# Patient Record
Sex: MALE | Race: WHITE | NOT HISPANIC OR LATINO | Employment: FULL TIME | URBAN - METROPOLITAN AREA
[De-identification: names, ages, dates, MRNs, and addresses within clinical notes are randomized per-mention and may not be internally consistent; named-entity substitution may affect disease eponyms.]

---

## 2023-06-22 LAB
ALANINE AMINOTRANSFERASE (SGPT) (U/L) IN SER/PLAS: 25 U/L (ref 10–52)
ALBUMIN (G/DL) IN SER/PLAS: 5 G/DL (ref 3.4–5)
ALKALINE PHOSPHATASE (U/L) IN SER/PLAS: 66 U/L (ref 33–120)
AMPHETAMINE (PRESENCE) IN URINE BY SCREEN METHOD: NORMAL
ASPARTATE AMINOTRANSFERASE (SGOT) (U/L) IN SER/PLAS: 23 U/L (ref 9–39)
BARBITURATES PRESENCE IN URINE BY SCREEN METHOD: NORMAL
BASOPHILS (10*3/UL) IN BLOOD BY AUTOMATED COUNT: 0.03 X10E9/L (ref 0–0.1)
BASOPHILS/100 LEUKOCYTES IN BLOOD BY AUTOMATED COUNT: 0.5 % (ref 0–2)
BENZODIAZEPINE (PRESENCE) IN URINE BY SCREEN METHOD: NORMAL
BILIRUBIN DIRECT (MG/DL) IN SER/PLAS: 0.3 MG/DL (ref 0–0.3)
BILIRUBIN TOTAL (MG/DL) IN SER/PLAS: 1.4 MG/DL (ref 0–1.2)
C REACTIVE PROTEIN (MG/L) IN SER/PLAS: 0.22 MG/DL
CALPROTECTIN, STOOL: NORMAL
CANNABINOIDS IN URINE BY SCREEN METHOD: NORMAL
COCAINE (PRESENCE) IN URINE BY SCREEN METHOD: NORMAL
DRUG SCREEN COMMENT URINE: NORMAL
EOSINOPHILS (10*3/UL) IN BLOOD BY AUTOMATED COUNT: 0.06 X10E9/L (ref 0–0.7)
EOSINOPHILS/100 LEUKOCYTES IN BLOOD BY AUTOMATED COUNT: 1 % (ref 0–6)
ERYTHROCYTE DISTRIBUTION WIDTH (RATIO) BY AUTOMATED COUNT: 12.8 % (ref 11.5–14.5)
ERYTHROCYTE MEAN CORPUSCULAR HEMOGLOBIN CONCENTRATION (G/DL) BY AUTOMATED: 34.1 G/DL (ref 32–36)
ERYTHROCYTE MEAN CORPUSCULAR VOLUME (FL) BY AUTOMATED COUNT: 91 FL (ref 80–100)
ERYTHROCYTES (10*6/UL) IN BLOOD BY AUTOMATED COUNT: 5.46 X10E12/L (ref 4.5–5.9)
FENTANYL URINE: NORMAL
HEMATOCRIT (%) IN BLOOD BY AUTOMATED COUNT: 49.8 % (ref 41–52)
HEMOGLOBIN (G/DL) IN BLOOD: 17 G/DL (ref 13.5–17.5)
IMMATURE GRANULOCYTES/100 LEUKOCYTES IN BLOOD BY AUTOMATED COUNT: 0.3 % (ref 0–0.9)
LEUKOCYTES (10*3/UL) IN BLOOD BY AUTOMATED COUNT: 6.1 X10E9/L (ref 4.4–11.3)
LYMPHOCYTES (10*3/UL) IN BLOOD BY AUTOMATED COUNT: 1.31 X10E9/L (ref 1.2–4.8)
LYMPHOCYTES/100 LEUKOCYTES IN BLOOD BY AUTOMATED COUNT: 21.5 % (ref 13–44)
METHADONE (PRESENCE) IN URINE BY SCREEN METHOD: NORMAL
MONOCYTES (10*3/UL) IN BLOOD BY AUTOMATED COUNT: 0.45 X10E9/L (ref 0.1–1)
MONOCYTES/100 LEUKOCYTES IN BLOOD BY AUTOMATED COUNT: 7.4 % (ref 2–10)
NEUTROPHILS (10*3/UL) IN BLOOD BY AUTOMATED COUNT: 4.21 X10E9/L (ref 1.2–7.7)
NEUTROPHILS/100 LEUKOCYTES IN BLOOD BY AUTOMATED COUNT: 69.3 % (ref 40–80)
NRBC (PER 100 WBCS) BY AUTOMATED COUNT: 0 /100 WBC (ref 0–0)
OPIATES (PRESENCE) IN URINE BY SCREEN METHOD: NORMAL
OXYCODONE (PRESENCE) IN URINE BY SCREEN METHOD: NORMAL
PHENCYCLIDINE (PRESENCE) IN URINE BY SCREEN METHOD: NORMAL
PLATELETS (10*3/UL) IN BLOOD AUTOMATED COUNT: 309 X10E9/L (ref 150–450)
PROTEIN TOTAL: 7.6 G/DL (ref 6.4–8.2)

## 2023-06-23 LAB
CALCIDIOL (25 OH VITAMIN D3) (NG/ML) IN SER/PLAS: 29 NG/ML
COBALAMIN (VITAMIN B12) (PG/ML) IN SER/PLAS: 263 PG/ML (ref 211–911)

## 2023-06-25 LAB
NIL(NEG) CONTROL SPOT COUNT: NORMAL
PANEL A SPOT COUNT: 0
PANEL B SPOT COUNT: 0
POS CONTROL SPOT COUNT: NORMAL
T-SPOT. TB INTERPRETATION: NEGATIVE

## 2023-08-31 PROBLEM — I10 ESSENTIAL HYPERTENSION: Status: ACTIVE | Noted: 2023-08-31

## 2023-08-31 PROBLEM — E53.8 VITAMIN B 12 DEFICIENCY: Status: ACTIVE | Noted: 2023-08-31

## 2023-08-31 PROBLEM — E55.9 VITAMIN D DEFICIENCY: Status: ACTIVE | Noted: 2023-08-31

## 2023-08-31 PROBLEM — K61.1 PERIRECTAL ABSCESS: Status: ACTIVE | Noted: 2023-08-31

## 2023-08-31 PROBLEM — K50.90 CROHN'S DISEASE (MULTI): Status: ACTIVE | Noted: 2023-08-31

## 2023-08-31 PROBLEM — K52.9 CHRONIC DIARRHEA: Status: ACTIVE | Noted: 2023-08-31

## 2023-08-31 PROBLEM — F41.0 PANIC ATTACKS: Status: ACTIVE | Noted: 2023-08-31

## 2023-08-31 PROBLEM — K60.3 TRANSSPHINCTERIC ANAL FISTULA: Status: ACTIVE | Noted: 2023-08-31

## 2023-08-31 PROBLEM — K60.329 TRANSSPHINCTERIC ANAL FISTULA: Status: ACTIVE | Noted: 2023-08-31

## 2023-08-31 RX ORDER — ATENOLOL AND CHLORTHALIDONE TABLET 100; 25 MG/1; MG/1
1 TABLET ORAL DAILY
COMMUNITY
End: 2023-10-31 | Stop reason: ALTCHOICE

## 2023-08-31 RX ORDER — EMTRICITABINE AND TENOFOVIR DISOPROXIL FUMARATE 200; 300 MG/1; MG/1
TABLET, FILM COATED ORAL EVERY 24 HOURS
COMMUNITY
Start: 2019-05-21 | End: 2023-10-31 | Stop reason: ALTCHOICE

## 2023-08-31 RX ORDER — PNV NO.95/FERROUS FUM/FOLIC AC 28MG-0.8MG
1 TABLET ORAL DAILY
COMMUNITY
Start: 2022-02-23 | End: 2023-10-31 | Stop reason: ALTCHOICE

## 2023-08-31 RX ORDER — CIPROFLOXACIN 500 MG/1
1 TABLET ORAL 2 TIMES DAILY
COMMUNITY
Start: 2022-02-10 | End: 2023-10-31 | Stop reason: ALTCHOICE

## 2023-08-31 RX ORDER — DESVENLAFAXINE 50 MG/1
1 TABLET, EXTENDED RELEASE ORAL DAILY
COMMUNITY
End: 2023-10-31 | Stop reason: ALTCHOICE

## 2023-08-31 RX ORDER — MERCAPTOPURINE 50 MG/1
2 TABLET ORAL DAILY
COMMUNITY
Start: 2022-03-03 | End: 2023-10-31 | Stop reason: ALTCHOICE

## 2023-08-31 RX ORDER — DIPHENOXYLATE HYDROCHLORIDE AND ATROPINE SULFATE 2.5; .025 MG/1; MG/1
TABLET ORAL 4 TIMES DAILY
COMMUNITY

## 2023-10-09 ENCOUNTER — ANESTHESIA EVENT (OUTPATIENT)
Dept: GASTROENTEROLOGY | Facility: HOSPITAL | Age: 33
End: 2023-10-09
Payer: COMMERCIAL

## 2023-10-09 ENCOUNTER — ANESTHESIA (OUTPATIENT)
Dept: GASTROENTEROLOGY | Facility: HOSPITAL | Age: 33
End: 2023-10-09
Payer: COMMERCIAL

## 2023-10-09 ENCOUNTER — HOSPITAL ENCOUNTER (OUTPATIENT)
Dept: GASTROENTEROLOGY | Facility: HOSPITAL | Age: 33
Discharge: HOME | End: 2023-10-09
Payer: COMMERCIAL

## 2023-10-09 VITALS
HEART RATE: 80 BPM | RESPIRATION RATE: 18 BRPM | WEIGHT: 200 LBS | BODY MASS INDEX: 28.63 KG/M2 | TEMPERATURE: 98.2 F | DIASTOLIC BLOOD PRESSURE: 92 MMHG | SYSTOLIC BLOOD PRESSURE: 129 MMHG | HEIGHT: 70 IN | OXYGEN SATURATION: 96 %

## 2023-10-09 DIAGNOSIS — Z12.11 ENCOUNTER FOR SCREENING FOR MALIGNANT NEOPLASM OF COLON: Primary | ICD-10-CM

## 2023-10-09 PROCEDURE — A45380 PR COLONOSCOPY,BIOPSY

## 2023-10-09 PROCEDURE — 2580000001 HC RX 258 IV SOLUTIONS: Performed by: STUDENT IN AN ORGANIZED HEALTH CARE EDUCATION/TRAINING PROGRAM

## 2023-10-09 PROCEDURE — 7100000010 HC PHASE TWO TIME - EACH INCREMENTAL 1 MINUTE: Performed by: STUDENT IN AN ORGANIZED HEALTH CARE EDUCATION/TRAINING PROGRAM

## 2023-10-09 PROCEDURE — 2500000005 HC RX 250 GENERAL PHARMACY W/O HCPCS

## 2023-10-09 PROCEDURE — 88305 TISSUE EXAM BY PATHOLOGIST: CPT | Mod: TC,SUR | Performed by: INTERNAL MEDICINE

## 2023-10-09 PROCEDURE — A45380 PR COLONOSCOPY,BIOPSY: Performed by: ANESTHESIOLOGY

## 2023-10-09 PROCEDURE — 7100000009 HC PHASE TWO TIME - INITIAL BASE CHARGE: Performed by: STUDENT IN AN ORGANIZED HEALTH CARE EDUCATION/TRAINING PROGRAM

## 2023-10-09 PROCEDURE — 2720000007 HC OR 272 NO HCPCS: Performed by: STUDENT IN AN ORGANIZED HEALTH CARE EDUCATION/TRAINING PROGRAM

## 2023-10-09 PROCEDURE — 3700000002 HC GENERAL ANESTHESIA TIME - EACH INCREMENTAL 1 MINUTE: Performed by: STUDENT IN AN ORGANIZED HEALTH CARE EDUCATION/TRAINING PROGRAM

## 2023-10-09 PROCEDURE — 45380 COLONOSCOPY AND BIOPSY: CPT | Performed by: INTERNAL MEDICINE

## 2023-10-09 PROCEDURE — 3700000001 HC GENERAL ANESTHESIA TIME - INITIAL BASE CHARGE: Performed by: STUDENT IN AN ORGANIZED HEALTH CARE EDUCATION/TRAINING PROGRAM

## 2023-10-09 PROCEDURE — 88305 TISSUE EXAM BY PATHOLOGIST: CPT | Mod: TC | Performed by: INTERNAL MEDICINE

## 2023-10-09 PROCEDURE — 88305 TISSUE EXAM BY PATHOLOGIST: CPT | Performed by: PATHOLOGY

## 2023-10-09 PROCEDURE — 45386 COLONOSCOPY W/BALLOON DILAT: CPT | Performed by: INTERNAL MEDICINE

## 2023-10-09 PROCEDURE — C1726 CATH, BAL DIL, NON-VASCULAR: HCPCS | Performed by: STUDENT IN AN ORGANIZED HEALTH CARE EDUCATION/TRAINING PROGRAM

## 2023-10-09 PROCEDURE — 2500000004 HC RX 250 GENERAL PHARMACY W/ HCPCS (ALT 636 FOR OP/ED)

## 2023-10-09 RX ORDER — ONDANSETRON HYDROCHLORIDE 2 MG/ML
INJECTION, SOLUTION INTRAVENOUS AS NEEDED
Status: DISCONTINUED | OUTPATIENT
Start: 2023-10-09 | End: 2023-10-09

## 2023-10-09 RX ORDER — PROPOFOL 10 MG/ML
INJECTION, EMULSION INTRAVENOUS AS NEEDED
Status: DISCONTINUED | OUTPATIENT
Start: 2023-10-09 | End: 2023-10-09

## 2023-10-09 RX ORDER — PROPOFOL 10 MG/ML
INJECTION, EMULSION INTRAVENOUS CONTINUOUS PRN
Status: DISCONTINUED | OUTPATIENT
Start: 2023-10-09 | End: 2023-10-09

## 2023-10-09 RX ORDER — LIDOCAINE HCL/PF 100 MG/5ML
SYRINGE (ML) INTRAVENOUS AS NEEDED
Status: DISCONTINUED | OUTPATIENT
Start: 2023-10-09 | End: 2023-10-09

## 2023-10-09 RX ORDER — SODIUM CHLORIDE, SODIUM LACTATE, POTASSIUM CHLORIDE, CALCIUM CHLORIDE 600; 310; 30; 20 MG/100ML; MG/100ML; MG/100ML; MG/100ML
20 INJECTION, SOLUTION INTRAVENOUS CONTINUOUS
Status: DISCONTINUED | OUTPATIENT
Start: 2023-10-09 | End: 2023-10-20 | Stop reason: HOSPADM

## 2023-10-09 RX ADMIN — SODIUM CHLORIDE, POTASSIUM CHLORIDE, SODIUM LACTATE AND CALCIUM CHLORIDE: 600; 310; 30; 20 INJECTION, SOLUTION INTRAVENOUS at 10:54

## 2023-10-09 RX ADMIN — LIDOCAINE HYDROCHLORIDE 80 MG: 20 INJECTION INTRAVENOUS at 10:58

## 2023-10-09 RX ADMIN — PROPOFOL 50 MG: 10 INJECTION, EMULSION INTRAVENOUS at 10:59

## 2023-10-09 RX ADMIN — PROPOFOL 200 MCG/KG/MIN: 10 INJECTION, EMULSION INTRAVENOUS at 10:58

## 2023-10-09 RX ADMIN — PROPOFOL 100 MG: 10 INJECTION, EMULSION INTRAVENOUS at 10:58

## 2023-10-09 ASSESSMENT — PAIN - FUNCTIONAL ASSESSMENT
PAIN_FUNCTIONAL_ASSESSMENT: 0-10

## 2023-10-09 ASSESSMENT — PAIN SCALES - GENERAL
PAINLEVEL_OUTOF10: 0 - NO PAIN
PAIN_LEVEL: 0
PAINLEVEL_OUTOF10: 0 - NO PAIN
PAINLEVEL_OUTOF10: 0 - NO PAIN
PAINLEVEL_OUTOF10: 2
PAINLEVEL_OUTOF10: 0 - NO PAIN

## 2023-10-09 ASSESSMENT — COLUMBIA-SUICIDE SEVERITY RATING SCALE - C-SSRS
2. HAVE YOU ACTUALLY HAD ANY THOUGHTS OF KILLING YOURSELF?: NO
6. HAVE YOU EVER DONE ANYTHING, STARTED TO DO ANYTHING, OR PREPARED TO DO ANYTHING TO END YOUR LIFE?: NO
1. IN THE PAST MONTH, HAVE YOU WISHED YOU WERE DEAD OR WISHED YOU COULD GO TO SLEEP AND NOT WAKE UP?: NO

## 2023-10-09 NOTE — H&P
History Of Present Illness  Benjamín Bear is a 33 y.o. male presenting for colonoscopy for Crohn's disease.     Past Medical History  Past Medical History:   Diagnosis Date    Other conditions influencing health status 02/11/2022    No significant past medical history       Surgical History  Past Surgical History:   Procedure Laterality Date    OTHER SURGICAL HISTORY  02/11/2022    Ileostomy    OTHER SURGICAL HISTORY  02/11/2022    Colectomy    OTHER SURGICAL HISTORY  02/11/2022    Ileostomy closure        Social History  He has no history on file for tobacco use, alcohol use, and drug use.    Family History  No family history on file.     Allergies  Midazolam and Penicillins    Review of Systems     Physical Exam     Last Recorded Vitals  There were no vitals taken for this visit.    Relevant Results           Assessment/Plan           Heather Gambino MD

## 2023-10-09 NOTE — ANESTHESIA POSTPROCEDURE EVALUATION
Patient: Benjamín Bear    Procedure Summary       Date: 10/09/23 Room / Location: [facility]    Anesthesia Start: 1054 Anesthesia Stop: 1144    Procedure: COLONOSCOPY Diagnosis:       Encounter for screening for malignant neoplasm of colon      Encounter for screening for malignant neoplasm of colon    Scheduled Providers: Tushar Pagan MD; Brittany Logan RN; Jenny Carrillo MD Responsible Provider: Jenny Carrillo MD    Anesthesia Type: MAC ASA Status: 2            Anesthesia Type: MAC    Vitals Value Taken Time   /66 10/09/23 1145   Temp 36.8 °C (98.2 °F) 10/09/23 1145   Pulse 71 10/09/23 1145   Resp 18 10/09/23 1145   SpO2 98 % 10/09/23 1145       Anesthesia Post Evaluation    Patient location during evaluation: PACU  Patient participation: complete - patient participated  Level of consciousness: awake  Pain score: 0  Pain management: adequate  There was medical reason for not using a multimodal analgesia pain management approach.Airway patency: patent  There was medical reason for not screening for obstructive sleep apnea and/or not using of two or more mitigation strategies.Cardiovascular status: acceptable  Respiratory status: acceptable  Hydration status: acceptable    There were no known notable events for this encounter.

## 2023-10-09 NOTE — ANESTHESIA PREPROCEDURE EVALUATION
Patient: Benjamín Bear    Procedure Information       Date/Time: 10/09/23 1030    Scheduled providers: Tushar Pagan MD; Brittany Logan RN; Jenny Carrillo MD    Procedure: COLONOSCOPY    Location: [facility]            Relevant Problems   Cardiovascular   (+) Essential hypertension      GI   (+) Chronic diarrhea   (+) Crohn's disease (CMS/HCC)      Neuro/Psych   (+) Panic attacks       Clinical information reviewed:  There were no vitals filed for this visit.    Past Surgical History:   Procedure Laterality Date   • OTHER SURGICAL HISTORY  02/11/2022    Ileostomy   • OTHER SURGICAL HISTORY  02/11/2022    Colectomy   • OTHER SURGICAL HISTORY  02/11/2022    Ileostomy closure     Past Medical History:   Diagnosis Date   • Other conditions influencing health status 02/11/2022    No significant past medical history       Current Outpatient Medications:   •  atenoloL-chlorthalidone (Tenoretic) 100-25 mg tablet, Take 1 tablet by mouth once daily., Disp: , Rfl:   •  ciprofloxacin (Cipro) 500 mg tablet, Take 1 tablet (500 mg) by mouth 2 times a day., Disp: , Rfl:   •  cyanocobalamin (Vitamin B-12) 100 mcg tablet, Take 1 tablet (100 mcg) by mouth once daily., Disp: , Rfl:   •  desvenlafaxine (Pristiq) 50 mg 24 hr tablet, Take 1 tablet (50 mg) by mouth once daily., Disp: , Rfl:   •  diphenoxylate-atropine (Lomotil) 2.5-0.025 mg tablet, Take by mouth 4 times a day., Disp: , Rfl:   •  emtricitabine-tenofovir, TDF, (Truvada) 200-300 mg tablet, once every 24 hours., Disp: , Rfl:   •  mercaptopurine (Purinethol) 50 mg tablet, Take 2 tablets (100 mg total) by mouth once daily., Disp: , Rfl:   Prior to Admission medications    Medication Sig Start Date End Date Taking? Authorizing Provider   atenoloL-chlorthalidone (Tenoretic) 100-25 mg tablet Take 1 tablet by mouth once daily.    Historical Provider, MD   ciprofloxacin (Cipro) 500 mg tablet Take 1 tablet (500 mg) by mouth 2 times a day. 2/10/22   Historical  Provider, MD   cyanocobalamin (Vitamin B-12) 100 mcg tablet Take 1 tablet (100 mcg) by mouth once daily. 2/23/22   Historical Provider, MD   desvenlafaxine (Pristiq) 50 mg 24 hr tablet Take 1 tablet (50 mg) by mouth once daily.    Historical Provider, MD   diphenoxylate-atropine (Lomotil) 2.5-0.025 mg tablet Take by mouth 4 times a day.    Historical Provider, MD   emtricitabine-tenofovir, TDF, (Truvada) 200-300 mg tablet once every 24 hours. 5/21/19   Historical Provider, MD   mercaptopurine (Purinethol) 50 mg tablet Take 2 tablets (100 mg total) by mouth once daily. 3/3/22   Historical Provider, MD           Chemistry    Lab Results   Component Value Date/Time     06/30/2022 2315    K 4.1 06/30/2022 2315     06/30/2022 2315    CO2 27 06/30/2022 2315    BUN 18 06/30/2022 2315    CREATININE 1.23 06/30/2022 2315    Lab Results   Component Value Date/Time    CALCIUM 8.8 06/30/2022 2315    ALKPHOS 66 06/22/2023 1439    AST 23 06/22/2023 1439    ALT 25 06/22/2023 1439    BILITOT 1.4 (H) 06/22/2023 1439          Lab Results   Component Value Date/Time    WBC 6.1 06/22/2023 1439    HGB 17.0 06/22/2023 1439    HCT 49.8 06/22/2023 1439     06/22/2023 1439         NPO Detail:  No data recorded     Physical Exam    Airway  Mallampati: II  TM distance: <3 FB  Neck ROM: full     Cardiovascular   Rhythm: regular  Rate: normal     Dental        Pulmonary - normal exam     Abdominal        Anesthesia Plan    ASA 2     MAC     Anesthetic plan and risks discussed with patient.    Plan discussed with CAA and attending.

## 2023-10-17 LAB
LABORATORY COMMENT REPORT: NORMAL
PATH REPORT.FINAL DX SPEC: NORMAL
PATH REPORT.GROSS SPEC: NORMAL
PATH REPORT.TOTAL CANCER: NORMAL

## 2023-10-27 NOTE — PROGRESS NOTES
HPI    Benjamín Bear is a 33 y.o. male with a history of Crohn's disease diagnosed in 2014 c/b small bowel strictures, s/p multiple small bowel resections with DLI in 2017 and reversal in 2018. His predominant symptoms at the time of diagnosis were recurrent abdominal cramps with sometimes vomiting and recurrent diarrhea 10-15 BM's that was going on for few years before being diagnosed. He has failed multiple biologics including Remicade, Humira, Cimzia, Stelara, and Entyvio.      Starting in summer 2021 he had recurrent perianal pain with purulent drainage and was treated with steroids and abx intermittently. 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 Treated with antibiotics. On 2/23/22 he underwent EUA, seton placement and colonoscopy ([facility]).      He underwent another EUA with seton placement on 8/17/22 for a large right perianal abscess. He was seen by Margarita 3/28/23 and was found to have a deep and superficial perianal abscess at the insertion sites of the setons. He was treated with Cipro/Flagyl. He was last seen 6/2023 and was on Skyrizi and has undergone colonoscopy since his last visit.     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 He thinks drainage has decreased on average. He is having less build up and drainage. Discomfort has improved. Skin is better. Drainage almost completely stopped when he was on flagyl in June.     CRP 6/2023: 0.22     Colonoscopy 10/9/2023 ([facility]): Stricture (traversable after dilation) in the olimpia-terminal ileum; dilated to 15 mm end size. Dilation caused disruption of ring and improved passage of the scope. Mild Crohn's ileitis and left colitis with SES-CD aggregate score of 8, significantly improved from prior study (previously SES-CD score 20 in 2/2022). Performed forceps biopsies in the terminal ileum  and throughout the colon. Path of neoterminal ileum with chronic inactive inflammation. Three setons and one fistula opening actively draining purulent material on perianal exam. Repeat in 1 year.      Vapes nicotine/No ETOH/Occasional marijuana       Past Medical History:   Diagnosis Date    Other conditions influencing health status 02/11/2022    No significant past medical history    Personal history of other diseases of the digestive system     History of Crohn's disease       Past Surgical History:   Procedure Laterality Date    OTHER SURGICAL HISTORY  02/11/2022    Ileostomy    OTHER SURGICAL HISTORY  02/11/2022    Colectomy    OTHER SURGICAL HISTORY  02/11/2022    Ileostomy closure       Allergies   Allergen Reactions    Midazolam Other and Unknown    Penicillins Rash and Unknown         Physical Exam  Setons in place, still with moderate amount of drainage. Significant granulation tissue around setons. Only one of his setons is truly a perianal fistula; the others are in soft tissue only and not sphincters. One external opening with granulation only that does not have a seton in place.     Assessment and Plan:     Mr Bear still has more drainage than would be expected from his perineum. He does have a lot of granulation tissue.   Recent colonoscopy shows no active inflammation. I'd like to do some of the labs he was ordered for to assess for inflammation (ESR, CRP, calprotectin). Interestingly, when he has been on Flagyl, the perineum completely dries with minimal drainage.   He would like to get the setons out. We talked about the risk of recurrent abscess. However, he might also benefit from destruction of granulation tissue as that may be driving some of the drainage both from the tissue itself as well as preventing the setons from working optimally.   If he wants to try removing some of the lateral setons, since he fully understands the potential outcomes, I think its reasonable to try. However, he  has to be completely off nicotine. We also talked about the risk of nonhealing wounds or recurrent infections requiring return to the OR to put them back in. I will speak to him after labs result and I will also speak with Dr. Pagan.

## 2023-10-31 ENCOUNTER — LAB (OUTPATIENT)
Dept: LAB | Facility: LAB | Age: 33
End: 2023-10-31
Payer: COMMERCIAL

## 2023-10-31 ENCOUNTER — OFFICE VISIT (OUTPATIENT)
Dept: SURGERY | Facility: CLINIC | Age: 33
End: 2023-10-31
Payer: COMMERCIAL

## 2023-10-31 VITALS
HEART RATE: 74 BPM | DIASTOLIC BLOOD PRESSURE: 86 MMHG | SYSTOLIC BLOOD PRESSURE: 136 MMHG | WEIGHT: 197 LBS | BODY MASS INDEX: 28.27 KG/M2

## 2023-10-31 DIAGNOSIS — K52.9 CHRONIC DIARRHEA: ICD-10-CM

## 2023-10-31 DIAGNOSIS — K61.1 PERIRECTAL ABSCESS: ICD-10-CM

## 2023-10-31 PROCEDURE — 85652 RBC SED RATE AUTOMATED: CPT

## 2023-10-31 PROCEDURE — 3079F DIAST BP 80-89 MM HG: CPT | Performed by: SURGERY

## 2023-10-31 PROCEDURE — 99213 OFFICE O/P EST LOW 20 MIN: CPT | Performed by: SURGERY

## 2023-10-31 PROCEDURE — 3075F SYST BP GE 130 - 139MM HG: CPT | Performed by: SURGERY

## 2023-10-31 PROCEDURE — 86140 C-REACTIVE PROTEIN: CPT

## 2023-10-31 PROCEDURE — 36415 COLL VENOUS BLD VENIPUNCTURE: CPT

## 2023-10-31 RX ORDER — CERTOLIZUMAB PEGOL 200 MG/ML
INJECTION, SOLUTION SUBCUTANEOUS
Status: ON HOLD | COMMUNITY
End: 2024-01-23 | Stop reason: ALTCHOICE

## 2023-10-31 NOTE — LETTER
November 2, 2023     Tushar Pagan MD  [address]  Department Of Medicine-Gastroenterology  [facility] [address]    Patient: Benjamín Bear   YOB: 1990   Date of Visit: 10/31/2023       Dear Dr. Tushar Pagan MD:    Thank you for referring Benjamín Bear to me for evaluation. Below are my notes for this consultation.  If you have questions, please do not hesitate to call me. I look forward to following your patient along with you.       Sincerely,     Floridalma Magaña MD      CC: No Recipients  ______________________________________________________________________________________    HPI    Benjamín Bear is a 33 y.o. male with a history of Crohn's disease diagnosed in 2014 c/b small bowel strictures, s/p multiple small bowel resections with DLI in 2017 and reversal in 2018. His predominant symptoms at the time of diagnosis were recurrent abdominal cramps with sometimes vomiting and recurrent diarrhea 10-15 BM's that was going on for few years before being diagnosed. He has failed multiple biologics including Remicade, Humira, Cimzia, Stelara, and Entyvio.      Starting in summer 2021 he had recurrent perianal pain with purulent drainage and was treated with steroids and abx intermittently. 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 Treated with antibiotics. On 2/23/22 he underwent EUA, seton placement and colonoscopy ([facility]).      He underwent another EUA with seton placement on 8/17/22 for a large right perianal abscess. He was seen by Margarita 3/28/23 and was found to have a deep and superficial perianal abscess at the insertion sites of the setons. He was treated with Cipro/Flagyl. He was last seen 6/2023 and was on Skyrizi and has undergone colonoscopy since his last visit.     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 He thinks drainage has decreased on average. He is having less build up and drainage. Discomfort has improved. Skin is better. Drainage almost completely stopped when he was on flagyl in June.     CRP 6/2023: 0.22     Colonoscopy 10/9/2023 ([facility]): Stricture (traversable after dilation) in the olimpia-terminal ileum; dilated to 15 mm end size. Dilation caused disruption of ring and improved passage of the scope. Mild Crohn's ileitis and left colitis with SES-CD aggregate score of 8, significantly improved from prior study (previously SES-CD score 20 in 2/2022). Performed forceps biopsies in the terminal ileum and throughout the colon. Path of neoterminal ileum with chronic inactive inflammation. Three setons and one fistula opening actively draining purulent material on perianal exam. Repeat in 1 year.      Vapes nicotine/No ETOH/Occasional marijuana       Past Medical History:   Diagnosis Date   • Other conditions influencing health status 02/11/2022    No significant past medical history   • Personal history of other diseases of the digestive system     History of Crohn's disease       Past Surgical History:   Procedure Laterality Date   • OTHER SURGICAL HISTORY  02/11/2022    Ileostomy   • OTHER SURGICAL HISTORY  02/11/2022    Colectomy   • OTHER SURGICAL HISTORY  02/11/2022    Ileostomy closure       Allergies   Allergen Reactions   • Midazolam Other and Unknown   • Penicillins Rash and Unknown         Physical Exam  Setons in place, still with moderate amount of drainage. Significant granulation tissue around setons. Only one of his setons is truly a perianal fistula; the others are in soft tissue only and not sphincters. One external opening with granulation only that does not have a seton in place.     Assessment and Plan:     Mr Bear still has more drainage than would be expected from his perineum. He does have a lot of granulation tissue.   Recent colonoscopy shows no active inflammation. I'd like  to do some of the labs he was ordered for to assess for inflammation (ESR, CRP, calprotectin). Interestingly, when he has been on Flagyl, the perineum completely dries with minimal drainage.   He would like to get the setons out. We talked about the risk of recurrent abscess. However, he might also benefit from destruction of granulation tissue as that may be driving some of the drainage both from the tissue itself as well as preventing the setons from working optimally.   If he wants to try removing some of the lateral setons, since he fully understands the potential outcomes, I think its reasonable to try. However, he has to be completely off nicotine. We also talked about the risk of nonhealing wounds or recurrent infections requiring return to the OR to put them back in. I will speak to him after labs result and I will also speak with Dr. Pagan.

## 2023-11-01 ENCOUNTER — LAB (OUTPATIENT)
Dept: LAB | Facility: LAB | Age: 33
End: 2023-11-01
Payer: COMMERCIAL

## 2023-11-01 DIAGNOSIS — K61.1 PERIRECTAL ABSCESS: ICD-10-CM

## 2023-11-01 LAB
CRP SERPL-MCNC: 0.26 MG/DL
ERYTHROCYTE [SEDIMENTATION RATE] IN BLOOD BY WESTERGREN METHOD: 8 MM/H (ref 0–15)

## 2023-11-01 PROCEDURE — 83993 ASSAY FOR CALPROTECTIN FECAL: CPT

## 2023-11-05 LAB — CALPROTECTIN STL-MCNT: 11 UG/G

## 2023-11-16 DIAGNOSIS — K50.813 CROHN'S DISEASE OF BOTH SMALL AND LARGE INTESTINE WITH FISTULA (MULTI): Primary | ICD-10-CM

## 2023-11-16 RX ORDER — RISANKIZUMAB-RZAA 360 MG/2.4
WEARABLE INJECTOR SUBCUTANEOUS
Qty: 1 ML | Refills: 3 | Status: SHIPPED | OUTPATIENT
Start: 2023-11-16

## 2024-01-12 ENCOUNTER — PREP FOR PROCEDURE (OUTPATIENT)
Dept: POSTOP/PACU | Facility: HOSPITAL | Age: 34
End: 2024-01-12
Payer: COMMERCIAL

## 2024-01-12 DIAGNOSIS — K50.819 CROHN'S DISEASE OF SMALL AND LARGE INTESTINES WITH COMPLICATION (MULTI): Primary | ICD-10-CM

## 2024-01-23 ENCOUNTER — ANESTHESIA (OUTPATIENT)
Dept: OPERATING ROOM | Facility: HOSPITAL | Age: 34
End: 2024-01-23
Payer: COMMERCIAL

## 2024-01-23 ENCOUNTER — ANESTHESIA EVENT (OUTPATIENT)
Dept: OPERATING ROOM | Facility: HOSPITAL | Age: 34
End: 2024-01-23
Payer: COMMERCIAL

## 2024-01-23 ENCOUNTER — HOSPITAL ENCOUNTER (OUTPATIENT)
Facility: HOSPITAL | Age: 34
Setting detail: OUTPATIENT SURGERY
Discharge: HOME | End: 2024-01-23
Attending: SURGERY | Admitting: SURGERY
Payer: COMMERCIAL

## 2024-01-23 VITALS
OXYGEN SATURATION: 100 % | BODY MASS INDEX: 28.5 KG/M2 | HEIGHT: 70 IN | TEMPERATURE: 97.3 F | WEIGHT: 199.08 LBS | DIASTOLIC BLOOD PRESSURE: 92 MMHG | HEART RATE: 101 BPM | SYSTOLIC BLOOD PRESSURE: 136 MMHG | RESPIRATION RATE: 16 BRPM

## 2024-01-23 DIAGNOSIS — K50.813 CROHN'S DISEASE OF BOTH SMALL AND LARGE INTESTINE WITH FISTULA (MULTI): Primary | ICD-10-CM

## 2024-01-23 DIAGNOSIS — K50.819 CROHN'S DISEASE OF SMALL AND LARGE INTESTINES WITH COMPLICATION (MULTI): ICD-10-CM

## 2024-01-23 PROCEDURE — 46280 REMOVE ANAL FIST COMPLEX: CPT | Performed by: SURGERY

## 2024-01-23 PROCEDURE — 3700000001 HC GENERAL ANESTHESIA TIME - INITIAL BASE CHARGE: Performed by: SURGERY

## 2024-01-23 PROCEDURE — 2500000004 HC RX 250 GENERAL PHARMACY W/ HCPCS (ALT 636 FOR OP/ED): Performed by: SURGERY

## 2024-01-23 PROCEDURE — 2500000005 HC RX 250 GENERAL PHARMACY W/O HCPCS: Performed by: NURSE ANESTHETIST, CERTIFIED REGISTERED

## 2024-01-23 PROCEDURE — 2500000005 HC RX 250 GENERAL PHARMACY W/O HCPCS

## 2024-01-23 PROCEDURE — 2500000005 HC RX 250 GENERAL PHARMACY W/O HCPCS: Performed by: SURGERY

## 2024-01-23 PROCEDURE — 7100000002 HC RECOVERY ROOM TIME - EACH INCREMENTAL 1 MINUTE: Performed by: SURGERY

## 2024-01-23 PROCEDURE — 7100000001 HC RECOVERY ROOM TIME - INITIAL BASE CHARGE: Performed by: SURGERY

## 2024-01-23 PROCEDURE — 3600000008 HC OR TIME - EACH INCREMENTAL 1 MINUTE - PROCEDURE LEVEL THREE: Performed by: SURGERY

## 2024-01-23 PROCEDURE — 7100000010 HC PHASE TWO TIME - EACH INCREMENTAL 1 MINUTE: Performed by: SURGERY

## 2024-01-23 PROCEDURE — A4217 STERILE WATER/SALINE, 500 ML: HCPCS | Performed by: SURGERY

## 2024-01-23 PROCEDURE — 2500000001 HC RX 250 WO HCPCS SELF ADMINISTERED DRUGS (ALT 637 FOR MEDICARE OP): Performed by: ANESTHESIOLOGY

## 2024-01-23 PROCEDURE — 2720000007 HC OR 272 NO HCPCS: Performed by: SURGERY

## 2024-01-23 PROCEDURE — 3700000002 HC GENERAL ANESTHESIA TIME - EACH INCREMENTAL 1 MINUTE: Performed by: SURGERY

## 2024-01-23 PROCEDURE — 7100000009 HC PHASE TWO TIME - INITIAL BASE CHARGE: Performed by: SURGERY

## 2024-01-23 PROCEDURE — 2500000004 HC RX 250 GENERAL PHARMACY W/ HCPCS (ALT 636 FOR OP/ED): Performed by: NURSE ANESTHETIST, CERTIFIED REGISTERED

## 2024-01-23 PROCEDURE — 3600000003 HC OR TIME - INITIAL BASE CHARGE - PROCEDURE LEVEL THREE: Performed by: SURGERY

## 2024-01-23 RX ORDER — SODIUM CHLORIDE, SODIUM LACTATE, POTASSIUM CHLORIDE, CALCIUM CHLORIDE 600; 310; 30; 20 MG/100ML; MG/100ML; MG/100ML; MG/100ML
50 INJECTION, SOLUTION INTRAVENOUS CONTINUOUS
Status: CANCELLED | OUTPATIENT
Start: 2024-01-23

## 2024-01-23 RX ORDER — ONDANSETRON HYDROCHLORIDE 2 MG/ML
4 INJECTION, SOLUTION INTRAVENOUS ONCE AS NEEDED
Status: DISCONTINUED | OUTPATIENT
Start: 2024-01-23 | End: 2024-01-23 | Stop reason: HOSPADM

## 2024-01-23 RX ORDER — SODIUM CHLORIDE, SODIUM LACTATE, POTASSIUM CHLORIDE, CALCIUM CHLORIDE 600; 310; 30; 20 MG/100ML; MG/100ML; MG/100ML; MG/100ML
INJECTION, SOLUTION INTRAVENOUS CONTINUOUS PRN
Status: DISCONTINUED | OUTPATIENT
Start: 2024-01-23 | End: 2024-01-23

## 2024-01-23 RX ORDER — METRONIDAZOLE 250 MG/1
250 TABLET ORAL 3 TIMES DAILY
Qty: 21 TABLET | Refills: 0 | Status: SHIPPED | OUTPATIENT
Start: 2024-01-23 | End: 2024-01-30

## 2024-01-23 RX ORDER — POVIDONE-IODINE 10 %
SOLUTION, NON-ORAL TOPICAL AS NEEDED
Status: DISCONTINUED | OUTPATIENT
Start: 2024-01-23 | End: 2024-01-23 | Stop reason: HOSPADM

## 2024-01-23 RX ORDER — FENTANYL CITRATE 50 UG/ML
INJECTION, SOLUTION INTRAMUSCULAR; INTRAVENOUS AS NEEDED
Status: DISCONTINUED | OUTPATIENT
Start: 2024-01-23 | End: 2024-01-23

## 2024-01-23 RX ORDER — ONDANSETRON HYDROCHLORIDE 2 MG/ML
INJECTION, SOLUTION INTRAVENOUS AS NEEDED
Status: DISCONTINUED | OUTPATIENT
Start: 2024-01-23 | End: 2024-01-23

## 2024-01-23 RX ORDER — OXYCODONE HYDROCHLORIDE 5 MG/1
5 TABLET ORAL EVERY 6 HOURS PRN
Qty: 12 TABLET | Refills: 0 | Status: SHIPPED | OUTPATIENT
Start: 2024-01-23 | End: 2024-01-30

## 2024-01-23 RX ORDER — SODIUM CHLORIDE 0.9 G/100ML
IRRIGANT IRRIGATION AS NEEDED
Status: DISCONTINUED | OUTPATIENT
Start: 2024-01-23 | End: 2024-01-23 | Stop reason: HOSPADM

## 2024-01-23 RX ORDER — ACETAMINOPHEN 325 MG/1
650 TABLET ORAL EVERY 4 HOURS PRN
Status: DISCONTINUED | OUTPATIENT
Start: 2024-01-23 | End: 2024-01-23 | Stop reason: HOSPADM

## 2024-01-23 RX ORDER — BUPIVACAINE HYDROCHLORIDE AND EPINEPHRINE 2.5; 5 MG/ML; UG/ML
INJECTION, SOLUTION EPIDURAL; INFILTRATION; INTRACAUDAL; PERINEURAL
Status: COMPLETED
Start: 2024-01-23 | End: 2024-01-23

## 2024-01-23 RX ORDER — WATER 1 ML/ML
IRRIGANT IRRIGATION AS NEEDED
Status: DISCONTINUED | OUTPATIENT
Start: 2024-01-23 | End: 2024-01-23 | Stop reason: HOSPADM

## 2024-01-23 RX ORDER — LIDOCAINE HYDROCHLORIDE 20 MG/ML
INJECTION, SOLUTION INFILTRATION; PERINEURAL AS NEEDED
Status: DISCONTINUED | OUTPATIENT
Start: 2024-01-23 | End: 2024-01-23

## 2024-01-23 RX ORDER — LIDOCAINE IN NACL,ISO-OSMOT/PF 30 MG/3 ML
0.1 SYRINGE (ML) INJECTION ONCE
Status: DISCONTINUED | OUTPATIENT
Start: 2024-01-23 | End: 2024-01-23 | Stop reason: HOSPADM

## 2024-01-23 RX ORDER — OXYCODONE HYDROCHLORIDE 5 MG/1
5 TABLET ORAL EVERY 4 HOURS PRN
Status: DISCONTINUED | OUTPATIENT
Start: 2024-01-23 | End: 2024-01-23 | Stop reason: HOSPADM

## 2024-01-23 RX ORDER — PROPOFOL 10 MG/ML
INJECTION, EMULSION INTRAVENOUS AS NEEDED
Status: DISCONTINUED | OUTPATIENT
Start: 2024-01-23 | End: 2024-01-23

## 2024-01-23 RX ORDER — HYDROMORPHONE HYDROCHLORIDE 1 MG/ML
0.5 INJECTION, SOLUTION INTRAMUSCULAR; INTRAVENOUS; SUBCUTANEOUS EVERY 5 MIN PRN
Status: DISCONTINUED | OUTPATIENT
Start: 2024-01-23 | End: 2024-01-23 | Stop reason: HOSPADM

## 2024-01-23 RX ORDER — DEXAMETHASONE SODIUM PHOSPHATE 4 MG/ML
INJECTION, SOLUTION INTRA-ARTICULAR; INTRALESIONAL; INTRAMUSCULAR; INTRAVENOUS; SOFT TISSUE AS NEEDED
Status: DISCONTINUED | OUTPATIENT
Start: 2024-01-23 | End: 2024-01-23

## 2024-01-23 RX ORDER — SODIUM CHLORIDE, SODIUM LACTATE, POTASSIUM CHLORIDE, CALCIUM CHLORIDE 600; 310; 30; 20 MG/100ML; MG/100ML; MG/100ML; MG/100ML
100 INJECTION, SOLUTION INTRAVENOUS CONTINUOUS
Status: DISCONTINUED | OUTPATIENT
Start: 2024-01-23 | End: 2024-01-23 | Stop reason: HOSPADM

## 2024-01-23 RX ORDER — HYDROMORPHONE HYDROCHLORIDE 1 MG/ML
0.2 INJECTION, SOLUTION INTRAMUSCULAR; INTRAVENOUS; SUBCUTANEOUS EVERY 5 MIN PRN
Status: DISCONTINUED | OUTPATIENT
Start: 2024-01-23 | End: 2024-01-23 | Stop reason: HOSPADM

## 2024-01-23 RX ORDER — MIDAZOLAM HYDROCHLORIDE 1 MG/ML
INJECTION INTRAMUSCULAR; INTRAVENOUS CONTINUOUS PRN
Status: DISCONTINUED | OUTPATIENT
Start: 2024-01-23 | End: 2024-01-23

## 2024-01-23 RX ORDER — FENTANYL CITRATE 50 UG/ML
12.5 INJECTION, SOLUTION INTRAMUSCULAR; INTRAVENOUS EVERY 5 MIN PRN
Status: DISCONTINUED | OUTPATIENT
Start: 2024-01-23 | End: 2024-01-23 | Stop reason: HOSPADM

## 2024-01-23 RX ADMIN — FENTANYL CITRATE 100 MCG: 50 INJECTION, SOLUTION INTRAMUSCULAR; INTRAVENOUS at 08:54

## 2024-01-23 RX ADMIN — SODIUM CHLORIDE, POTASSIUM CHLORIDE, SODIUM LACTATE AND CALCIUM CHLORIDE: 600; 310; 30; 20 INJECTION, SOLUTION INTRAVENOUS at 08:32

## 2024-01-23 RX ADMIN — PROPOFOL 20 MG: 10 INJECTION, EMULSION INTRAVENOUS at 08:59

## 2024-01-23 RX ADMIN — PROPOFOL 20 MG: 10 INJECTION, EMULSION INTRAVENOUS at 09:17

## 2024-01-23 RX ADMIN — FENTANYL CITRATE 25 MCG: 50 INJECTION, SOLUTION INTRAMUSCULAR; INTRAVENOUS at 09:21

## 2024-01-23 RX ADMIN — ONDANSETRON 4 MG: 2 INJECTION INTRAMUSCULAR; INTRAVENOUS at 09:05

## 2024-01-23 RX ADMIN — PROPOFOL 200 MG: 10 INJECTION, EMULSION INTRAVENOUS at 08:55

## 2024-01-23 RX ADMIN — PROPOFOL 30 MG: 10 INJECTION, EMULSION INTRAVENOUS at 08:57

## 2024-01-23 RX ADMIN — OXYCODONE HYDROCHLORIDE 5 MG: 5 TABLET ORAL at 10:08

## 2024-01-23 RX ADMIN — DEXAMETHASONE SODIUM PHOSPHATE 8 MG: 4 INJECTION, SOLUTION INTRA-ARTICULAR; INTRALESIONAL; INTRAMUSCULAR; INTRAVENOUS; SOFT TISSUE at 09:04

## 2024-01-23 RX ADMIN — FENTANYL CITRATE 25 MCG: 50 INJECTION, SOLUTION INTRAMUSCULAR; INTRAVENOUS at 09:17

## 2024-01-23 RX ADMIN — LIDOCAINE HYDROCHLORIDE 40 MG: 20 INJECTION, SOLUTION INFILTRATION; PERINEURAL at 08:54

## 2024-01-23 SDOH — HEALTH STABILITY: MENTAL HEALTH: CURRENT SMOKER: 1

## 2024-01-23 ASSESSMENT — PAIN - FUNCTIONAL ASSESSMENT
PAIN_FUNCTIONAL_ASSESSMENT: 0-10

## 2024-01-23 ASSESSMENT — PAIN SCALES - GENERAL
PAINLEVEL_OUTOF10: 2
PAINLEVEL_OUTOF10: 0 - NO PAIN
PAINLEVEL_OUTOF10: 0 - NO PAIN
PAINLEVEL_OUTOF10: 2
PAIN_LEVEL: 0
PAINLEVEL_OUTOF10: 0 - NO PAIN
PAINLEVEL_OUTOF10: 2

## 2024-01-23 ASSESSMENT — COLUMBIA-SUICIDE SEVERITY RATING SCALE - C-SSRS
1. IN THE PAST MONTH, HAVE YOU WISHED YOU WERE DEAD OR WISHED YOU COULD GO TO SLEEP AND NOT WAKE UP?: NO
2. HAVE YOU ACTUALLY HAD ANY THOUGHTS OF KILLING YOURSELF?: NO
6. HAVE YOU EVER DONE ANYTHING, STARTED TO DO ANYTHING, OR PREPARED TO DO ANYTHING TO END YOUR LIFE?: NO

## 2024-01-23 NOTE — OP NOTE
Repair Fistula Anus Operative Note     Date: 2024  OR Location: [facility] OR    Name: Benjamín Bear, : 1990, Age: 33 y.o., MRN: [id number], Sex: male    Diagnosis  Pre-op Diagnosis     * Crohn's disease of small and large intestines with complication (CMS/HCC) [K50.819] Post-op Diagnosis     * Crohn's disease of small and large intestines with complication (CMS/HCC) [K50.819]     Procedures  Repair Fistula Anus  [id number] - ND TX ANAL FSTL TRANS/SUPRA/XTRASPHNCTRC INCL SETON      Surgeons      * Floridalma Magaña - Primary     * Michael Melo    Resident/Fellow/Other Assistant:  Surgeon(s) and Role:     * Michael Melo MD    Procedure Summary  Anesthesia: General  ASA: III  Anesthesia Staff: Anesthesiologist: Uche Wise MD  CRNA: CLOTILDE Schroeder-CRNA  C-AA: JJ Alexandra  Estimated Blood Loss: 10mL  Intra-op Medications:   Medication Name Total Dose   sodium chloride 0.9 % irrigation solution 1,000 mL   povidone-iodine (Betadine) 10 % topical solution 1 Application   sterile water irrigation solution 500 mL   BUPivacaine-EPINEPHrine (PF) (Marcaine w/EPI) injection  - Omnicell Override Pull 30 mL   oxyCODONE (Roxicodone) immediate release tablet 5 mg 5 mg              Anesthesia Record               Intraprocedure I/O Totals          Intake    LR infusion 300.00 mL    Total Intake 300 mL       Output    Est. Blood Loss 2 mL    Total Output 2 mL       Net    Net Volume 298 mL          Specimen: No specimens collected     Staff:   Circulator: Kendra Mustafa RN; Esperanza Simmons, CLAIRE  Scrub Person: Monika Strauss RN; Michael Dubois, CLAIRE         Drains and/or Catheters: * None in log *    Tourniquet Times:         Implants:     Findings: no infection or induration; setons in place with granulation tissue    Indications: Benjamín Bear is an 33 y.o. male who is having surgery for Crohn's disease of small and large intestines with complication (CMS/HCC) [K50.819].     The patient was seen  in the preoperative area. The risks, benefits, complications, treatment options, non-operative alternatives, expected recovery and outcomes were discussed with the patient. The possibilities of reaction to medication, pulmonary aspiration, injury to surrounding structures, bleeding, recurrent infection, the need for additional procedures, failure to diagnose a condition, and creating a complication requiring transfusion or operation were discussed with the patient. The patient concurred with the proposed plan, giving informed consent.  The site of surgery was properly noted/marked if necessary per policy. The patient has been actively warmed in preoperative area. Preoperative antibiotics are not indicated. Venous thrombosis prophylaxis have been ordered including bilateral sequential compression devices    Procedure Details:   Procedure:   Informed consent was obtained including discussion of risks, benefits, and alternatives. The patient was brought to the operating room and placed supine. Sequential compression devices were placed. Huddle was completed in accordance with hospital procedures. Anesthesia was induced and LMA was placed. The patient was placed in lithotomy with all pressure points padded. The area was prepped and draped in the usual fashion. Time out was completed.     Perineal and digital rectal exam were performed. Pudendal nerve block was performed with 1/4% marcaine with epinephrine. Intersphincteric block  was also performed. A total of 30cc of local anesthesia was utilized.    There was a seton in place at the anal verge in the right posterior. There was no sphincter involved. The other setons were more lateral; one in the posterior quadrant and one in the mid/anterior. They all connected to the same cavity. There was no induration, purulence, or other signs of infection or perianal Crohn's activity. KIA was normal. The two more lateral setons were removed and their associated tracts/cavities  curretted out to remove all granulation tissue. Hemostasis was assured. Openings were widened to promote drainage. The seton closest to the anus was left intact.     The patient was then returned to supine. Anesthesia was weaned and the endotracheal tube was removed. The patient was transferred to PACU awake and in stable conditions. Counts were reported correct at the end of the procedure. I was present and scrubbed throughout.      Complications:  None; patient tolerated the procedure well.    Disposition: PACU - hemodynamically stable.  Condition: stable         Additional Details: none    Attending Attestation:     Floridalma Magaña  Phone Number: [phone]

## 2024-01-23 NOTE — ANESTHESIA PREPROCEDURE EVALUATION
Patient: Benjamín Bear    Procedure Information       Date/Time: 01/23/24 0845    Procedure: Repair Fistula Anus    Location: [facility] OR 06 / Virtual [facility] OR    Surgeons: Floridalma Magaña MD            Relevant Problems   Cardiovascular   (+) Essential hypertension      GI   (+) Chronic diarrhea   (+) Crohn's disease (CMS/HCC)      Neuro/Psych   (+) Panic attacks       Clinical information reviewed:    Allergies                NPO Detail:  No data recorded     PHYSICAL EXAM    Anesthesia Plan    History of general anesthesia?: yes  History of complications of general anesthesia?: no    ASA 3     general     The patient is a current smoker.    intravenous and inhalational induction   Postoperative administration of opioids is intended.  Anesthetic plan and risks discussed with patient.  Use of blood products discussed with patient who consented to blood products.    Plan discussed with CAA.

## 2024-01-23 NOTE — H&P
History Of Present Illness  Benjamín Bear is a 33 y.o. male presenting with Crohn's and anal fistulas.     Past Medical History  Past Medical History:   Diagnosis Date    Other conditions influencing health status 02/11/2022    No significant past medical history    Personal history of other diseases of the digestive system     History of Crohn's disease       Surgical History  Past Surgical History:   Procedure Laterality Date    OTHER SURGICAL HISTORY  02/11/2022    Ileostomy    OTHER SURGICAL HISTORY  02/11/2022    Colectomy    OTHER SURGICAL HISTORY  02/11/2022    Ileostomy closure        Social History  He reports that he has never smoked. He uses smokeless tobacco. He reports current alcohol use of about 15.0 standard drinks of alcohol per week. He reports current drug use.    Family History  No family history on file.     Allergies  Midazolam and Penicillins      Physical Exam   NAD  Breathing comfortably  RRR   Last Recorded Vitals  There were no vitals taken for this visit.    Relevant Results             Assessment/Plan   Principal Problem:    Crohn's disease (CMS/[facility])      Plan for seton removal, surgical treatment of fistulae             Floridalma Magaña MD

## 2024-01-23 NOTE — ANESTHESIA PROCEDURE NOTES
Airway  Date/Time: 1/23/2024 8:59 AM  Urgency: elective      Staffing  Performed: CRNA   Authorized by: Uche Wise MD    Performed by: CLOTILDE Schroeder-SHAYNA  Patient location during procedure: OR    Indications and Patient Condition  Indications for airway management: anesthesia  Spontaneous ventilation: present  Sedation level: deep  Preoxygenated: yes  Patient position: sniffing  Mask difficulty assessment: 1 - vent by mask    Final Airway Details  Final airway type: supraglottic airway      Successful airway: classic  Size 4     Number of attempts at approach: 1

## 2024-01-23 NOTE — POST-PROCEDURE NOTE
Discharge instructions reviewed with patient and visitor. Sitz bath given to patient for home going.

## 2024-01-23 NOTE — ANESTHESIA POSTPROCEDURE EVALUATION
Patient: Benjamín Bear    Procedure Summary       Date: 01/23/24 Room / Location: [facility] OR 06 / [facility]    Anesthesia Start: 0849 Anesthesia Stop: 0944    Procedure: Repair Fistula Anus Diagnosis:       Crohn's disease of small and large intestines with complication (CMS/HCC)      (Crohn's disease of small and large intestines with complication (CMS/HCC) [K50.819])    Surgeons: Floridalma Magaña MD Responsible Provider: Uche Wise MD    Anesthesia Type: general ASA Status: 3            Anesthesia Type: general    Vitals Value Taken Time   /80 01/23/24 0938   Temp 36.5 °C (97.7 °F) 01/23/24 0938   Pulse 95 01/23/24 0941   Resp 15 01/23/24 0941   SpO2 96 % 01/23/24 0941   Vitals shown include unvalidated device data.    Anesthesia Post Evaluation    Patient location during evaluation: bedside  Patient participation: complete - patient participated  Level of consciousness: awake and alert  Pain score: 0  Pain management: adequate  Airway patency: patent  Cardiovascular status: acceptable  Respiratory status: acceptable and face mask  Hydration status: acceptable  Postoperative Nausea and Vomiting: none        No notable events documented.

## 2024-01-23 NOTE — BRIEF OP NOTE
Date: 2024  OR Location: [facility] OR    Name: Benjamín Bear, : 1990, Age: 33 y.o., MRN: [id number], Sex: male    Diagnosis  Pre-op Diagnosis     * Crohn's disease of small and large intestines with complication (CMS/HCC) [K50.819] Post-op Diagnosis     * Crohn's disease of small and large intestines with complication (CMS/HCC) [K50.819]     Procedures  Repair Fistula Anus  [id number] - [place] ANAL FSTL TRANS/SUPRA/XTRASPHNCTRC INCL SETON      Surgeons      * Floridalma Magaña - Primary     * Michael Melo    Resident/Fellow/Other Assistant:  Surgeon(s) and Role:     * Michael Melo MD    Procedure Summary  Anesthesia: General  ASA: III  Anesthesia Staff: Anesthesiologist: Uche Wise MD  CRNA: CLOTILDE Schroeder-CRNA  C-AA: JJ Alexandra  Estimated Blood Loss: 5mL  Intra-op Medications:   Medication Name Total Dose   sodium chloride 0.9 % irrigation solution 1,000 mL   povidone-iodine (Betadine) 10 % topical solution 1 Application   sterile water irrigation solution 500 mL   BUPivacaine-EPINEPHrine (PF) (Marcaine w/EPI) injection  - Omnicell Override Pull 30 mL              Anesthesia Record               Intraprocedure I/O Totals          Intake    LR infusion 300.00 mL    Total Intake 300 mL       Output    Est. Blood Loss 2 mL    Total Output 2 mL       Net    Net Volume 298 mL          Specimen: No specimens collected     Staff:   Circulator: Kendra Mustafa RN; Esperanza Simmons RN  Scrub Person: Monika Strauss RN; Michael Dubois RN          Findings: 3 setons in place with multiple subcutaneous tracts all of which were in communication with each other, significant granulation tissue but no evidence of acute inflammation     Complications:  None; patient tolerated the procedure well.     Disposition: PACU - hemodynamically stable.  Condition: stable  Specimens Collected: No specimens collected  Attending Attestation: I was present and scrubbed for the entire procedure.    Floridalma DONG  Gómez  Phone Number: [phone]

## 2024-07-16 DIAGNOSIS — K50.813 CROHN'S DISEASE OF BOTH SMALL AND LARGE INTESTINE WITH FISTULA (MULTI): ICD-10-CM

## 2024-07-16 RX ORDER — RISANKIZUMAB-RZAA 360 MG/2.4
WEARABLE INJECTOR SUBCUTANEOUS
Qty: 2.4 ML | Refills: 5 | Status: SHIPPED | OUTPATIENT
Start: 2024-07-16

## 2024-07-17 ENCOUNTER — TELEPHONE (OUTPATIENT)
Dept: GASTROENTEROLOGY | Facility: HOSPITAL | Age: 34
End: 2024-07-17
Payer: COMMERCIAL

## 2024-08-16 ENCOUNTER — TELEPHONE (OUTPATIENT)
Dept: GASTROENTEROLOGY | Facility: HOSPITAL | Age: 34
End: 2024-08-16
Payer: COMMERCIAL

## 2024-08-30 ENCOUNTER — TELEPHONE (OUTPATIENT)
Dept: GASTROENTEROLOGY | Facility: HOSPITAL | Age: 34
End: 2024-08-30
Payer: COMMERCIAL

## 2024-09-03 ENCOUNTER — OFFICE VISIT (OUTPATIENT)
Dept: GASTROENTEROLOGY | Facility: HOSPITAL | Age: 34
End: 2024-09-03
Payer: COMMERCIAL

## 2024-09-03 DIAGNOSIS — K50.813 CROHN'S DISEASE OF BOTH SMALL AND LARGE INTESTINE WITH FISTULA (MULTI): Primary | ICD-10-CM

## 2024-09-03 DIAGNOSIS — K90.9 DIARRHEA DUE TO MALABSORPTION (HHS-HCC): ICD-10-CM

## 2024-09-03 DIAGNOSIS — R19.7 DIARRHEA DUE TO MALABSORPTION (HHS-HCC): ICD-10-CM

## 2024-09-03 PROCEDURE — 99213 OFFICE O/P EST LOW 20 MIN: CPT | Performed by: INTERNAL MEDICINE

## 2024-09-03 RX ORDER — CHOLESTYRAMINE 4 G/9G
1 POWDER, FOR SUSPENSION ORAL 2 TIMES DAILY
Qty: 60 PACKET | Refills: 11 | Status: SHIPPED | OUTPATIENT
Start: 2024-09-03 | End: 2025-09-03

## 2024-09-03 RX ORDER — DIPHENOXYLATE HYDROCHLORIDE AND ATROPINE SULFATE 2.5; .025 MG/1; MG/1
2 TABLET ORAL 4 TIMES DAILY PRN
Qty: 56 TABLET | Refills: 0 | Status: SHIPPED | OUTPATIENT
Start: 2024-09-03 | End: 2024-09-10

## 2024-09-03 RX ORDER — LOPERAMIDE HYDROCHLORIDE 2 MG/1
CAPSULE ORAL
Qty: 240 CAPSULE | Refills: 11 | Status: SHIPPED | OUTPATIENT
Start: 2024-09-03

## 2024-09-03 NOTE — PATIENT INSTRUCTIONS
Thank you for meeting with me today for a virtual visit.  It is great that you continue to feel well on Skyrizi therapy.  As we discussed, we will add a trial of cholestyramine to see if this will improve your diarrhea.  Refills have been sent in.  Please complete labs, stool test, and urine test as ordered.  I will see you next month in the office as planned.    PLAN  1) continue Skyrizi on body injector every 8 weeks  2) check labs  3) check stool fecal calprotectin  4) check urine drug screen  5) we will start you on cholestyramine 1/2 to 1 packet once or twice daily as needed and as tolerated to see if this can decrease your liquid diarrheal stools.  Side effects could include nausea, abdominal discomfort, abdominal bloating, and constipation.  Stop the medication if you have significant side effects and call the office  6) refills of been sent in for limited supply of diphenoxylate with atropine (Lomotil).  We will send you a controlled substance agreement form.  Once this is completed and returned, I can prescribe larger supplies of this medication to be used 1 to 2 tablets every 6 hours as needed for diarrhea.  7) loperamide has been refilled  8) follow-up in the office for an in person visit n late October as planned

## 2024-09-03 NOTE — PROGRESS NOTES
REASON FOR VISIT:  Crohn's disease    HPI:  Benjamín Bear is a 34 y.o. male who presents for follow-up of complicated Crohn's disease.  Hx. of bipolar disorder, HTN, and ileocolonic Crohn s disease (dx 2014) c/b small bowel strictures s/p multiple SB resections with temporary DLI in 2017 s/p reversal in 2018. More recently developed significant perianal fistula/abscess 2022. Patient has failed multiple biologics including Remicade, Humira, Cimzia, Stelara, and Entyvio. Currently on Skyrizi.     2017 underwent small bowel resection x 2 w/ resection of 10cm strictured distal ileal segment w/ small bowel anastamosis and resection of 15 cm strictured TI + cecum w/ ileocolonic anastomosis. (25 cm total small bowel resected) (+) diverting loop ileostomy     1/2018: ileostomy closure (ileoscopy intra-op showed healthy appearing ileocolonic anastomosis).     Starting in summer 2021 had recurrent perianal pain with purulent drainage and was treated with steroids and abx. Moved to [place] early 2022 and had persistent symptoms of perianal fistulae/abscess.      2/2022 colonoscopy showed ulcerated stenosis in olimpia-TI 5 cm in with disease close to anastomosis and normal beyond 10 cm from anastomosis. Also with patchy colonic disease. SES-CD 20. (+) perianal fistula and seton placed in right postero-lateral fistula     Patient started on 6MP after procedure with plans to taper off prednisone. Did not improve on 6MP and never had therapeutic levels. Started on Skyrizi 8/2022.     Last seen 6/2023 and noted some improvement on the Skyrizi. 5-7 bowel movement (down from 15-20), blood with half of bowel movements. Stool was watery. He had gained weight (40 pounds since September). (+) nocturnal stools. Plan was to try Lomotil and fiber.     10/2023 colonoscopy showed a stricture (traversable after dilation) 5 cm into the olimpia-terminal ileum; dilated to 15 mm end size. Dilation caused disruption of ring and improved passage of the  scope; mild Crohn's ileitis and left colitis with SES-CD aggregate score of 8, significantly improved from prior study (previously SES-CD score 20 in 2/2022).    Had EUA 1/2024.  3 setons in place with multiple subcutaneous tracts all of which were in communication with each other, significant granulation tissue but no evidence of acute inflammation. 2 of the 3 setons removed.    In virtual follow-up today, overall he is doing well.  He is having 5 liquid stools daily.  However, he no longer has any nocturnal stools.  He will see blood intermittently and it is always bright red.  It would last for couple days and then resolve on its own.  He has no abdominal pain.  His appetite is good and weight is stable.  His last dose of Skyrizi was in the middle of August.    He states that he has always had liquid stools with his flares of Crohn's disease, but prior to his surgical resection, when the disease was more quiet the stools would form up some.  However ever since his surgery he has had persistent liquid stools even when he feels well.    He denies any oral ulcerations, joint discomfort, new rash, or eye inflammation.  He overall feels well on the Skyrizi.  He has not had any worsening or change in perianal disease since the setons were removed.  He has minimal drainage.  He is considering removing the final seton, but is in no rush to do so as symptoms are minimal.      REVIEW OF SYSTEMS  Complete review of systems otherwise negative per complaint    Allergies   Allergen Reactions    Midazolam Other and Unknown    Penicillins Rash and Unknown       Past Medical History:   Diagnosis Date    Crohn's disease (Multi)     Other conditions influencing health status 02/11/2022    No significant past medical history    Personal history of other diseases of the digestive system     History of Crohn's disease    PONV (postoperative nausea and vomiting)        Past Surgical History:   Procedure Laterality Date    OTHER  SURGICAL HISTORY  02/11/2022    Ileostomy    OTHER SURGICAL HISTORY  02/11/2022    Colectomy    OTHER SURGICAL HISTORY  02/11/2022    Ileostomy closure       Current Outpatient Medications   Medication Sig Dispense Refill    diphenoxylate-atropine (Lomotil) 2.5-0.025 mg tablet Take by mouth 4 times a day.      Skyrizi 360 mg/2.4 mL (150 mg/mL) wearable injector injection INSERT 1 CARTRIDGE INTO ON-BODY INJECTOR AND INJECT 360 MG UNDER THE SKIN EVERY 8 WEEKS. 2.4 mL 5     No current facility-administered medications for this visit.       PHYSICAL EXAM:  There were no vitals taken for this visit.  This was a virtual visit.  On video chat, the patient was alert and in no acute distress.  He looked well.    ASSESSMENT  # Crohn's disease-he has ileal, colonic, and perianal Crohn's disease.  Disease has been difficult to control and he has failed multiple medications.  He has been on Skyrizi since summer 2022 and has had steady improvement in symptoms and improved disease control.  He is happy with response to medication.  He is tolerating medication without difficulty.  We will continue the current therapy without change.  He is due for laboratory monitoring.  Next colonoscopy would be in the fall 2026 as long as patient continues to do well.    # Liquid diarrheal stools-he may have a component of bile acid diarrhea.  We will give a trial of cholestyramine.  We will also continue loperamide and Lomotil as needed.  Currently, he is taking 1 or 2 of these every morning.  He is willing to do urine drug screening and complete a new controlled substance agreement for continued Lomotil prescribing.  We discussed the appropriate way of using cholestyramine and not to take it for an hour before or after any other medication.    PLAN  1) continue Skyrizi on body injector every 8 weeks  2) check labs  3) check stool fecal calprotectin  4) check urine drug screen  5) we will start you on cholestyramine 1/2 to 1 packet once or twice  daily as needed and as tolerated to see if this can decrease your liquid diarrheal stools.  Side effects could include nausea, abdominal discomfort, abdominal bloating, and constipation.  Stop the medication if you have significant side effects and call the office  6) refills of been sent in for limited supply of diphenoxylate with atropine (Lomotil).  We will send you a controlled substance agreement form.  Once this is completed and returned, I can prescribe larger supplies of this medication to be used 1 to 2 tablets every 6 hours as needed for diarrhea.  7) loperamide has been refilled  8) follow-up in the office for an in person visit n late October as planned

## 2024-09-10 ENCOUNTER — LAB (OUTPATIENT)
Dept: LAB | Facility: LAB | Age: 34
End: 2024-09-10
Payer: COMMERCIAL

## 2024-09-10 DIAGNOSIS — K50.813 CROHN'S DISEASE OF BOTH SMALL AND LARGE INTESTINE WITH FISTULA (MULTI): ICD-10-CM

## 2024-09-10 LAB
ALBUMIN SERPL BCP-MCNC: 4.6 G/DL (ref 3.4–5)
ALP SERPL-CCNC: 64 U/L (ref 33–120)
ALT SERPL W P-5'-P-CCNC: 24 U/L (ref 10–52)
ANION GAP SERPL CALC-SCNC: 13 MMOL/L (ref 10–20)
AST SERPL W P-5'-P-CCNC: 23 U/L (ref 9–39)
BASOPHILS # BLD AUTO: 0.04 X10*3/UL (ref 0–0.1)
BASOPHILS NFR BLD AUTO: 0.9 %
BILIRUB SERPL-MCNC: 1.3 MG/DL (ref 0–1.2)
BUN SERPL-MCNC: 16 MG/DL (ref 6–23)
CALCIUM SERPL-MCNC: 10 MG/DL (ref 8.6–10.6)
CHLORIDE SERPL-SCNC: 102 MMOL/L (ref 98–107)
CO2 SERPL-SCNC: 29 MMOL/L (ref 21–32)
CREAT SERPL-MCNC: 1.2 MG/DL (ref 0.5–1.3)
CRP SERPL-MCNC: <0.1 MG/DL
EGFRCR SERPLBLD CKD-EPI 2021: 82 ML/MIN/1.73M*2
EOSINOPHIL # BLD AUTO: 0.08 X10*3/UL (ref 0–0.7)
EOSINOPHIL NFR BLD AUTO: 1.8 %
ERYTHROCYTE [DISTWIDTH] IN BLOOD BY AUTOMATED COUNT: 12.1 % (ref 11.5–14.5)
GLUCOSE SERPL-MCNC: 86 MG/DL (ref 74–99)
HCT VFR BLD AUTO: 47.6 % (ref 41–52)
HGB BLD-MCNC: 16.4 G/DL (ref 13.5–17.5)
IMM GRANULOCYTES # BLD AUTO: 0.04 X10*3/UL (ref 0–0.7)
IMM GRANULOCYTES NFR BLD AUTO: 0.9 % (ref 0–0.9)
LYMPHOCYTES # BLD AUTO: 1.83 X10*3/UL (ref 1.2–4.8)
LYMPHOCYTES NFR BLD AUTO: 40.4 %
MCH RBC QN AUTO: 30 PG (ref 26–34)
MCHC RBC AUTO-ENTMCNC: 34.5 G/DL (ref 32–36)
MCV RBC AUTO: 87 FL (ref 80–100)
MONOCYTES # BLD AUTO: 0.39 X10*3/UL (ref 0.1–1)
MONOCYTES NFR BLD AUTO: 8.6 %
NEUTROPHILS # BLD AUTO: 2.15 X10*3/UL (ref 1.2–7.7)
NEUTROPHILS NFR BLD AUTO: 47.4 %
NRBC BLD-RTO: 0 /100 WBCS (ref 0–0)
PLATELET # BLD AUTO: 307 X10*3/UL (ref 150–450)
POTASSIUM SERPL-SCNC: 4.7 MMOL/L (ref 3.5–5.3)
PROT SERPL-MCNC: 7 G/DL (ref 6.4–8.2)
RBC # BLD AUTO: 5.46 X10*6/UL (ref 4.5–5.9)
SODIUM SERPL-SCNC: 139 MMOL/L (ref 136–145)
WBC # BLD AUTO: 4.5 X10*3/UL (ref 4.4–11.3)

## 2024-09-10 PROCEDURE — 36415 COLL VENOUS BLD VENIPUNCTURE: CPT

## 2024-09-10 PROCEDURE — 86481 TB AG RESPONSE T-CELL SUSP: CPT

## 2024-09-10 PROCEDURE — 86140 C-REACTIVE PROTEIN: CPT

## 2024-09-10 PROCEDURE — 85025 COMPLETE CBC W/AUTO DIFF WBC: CPT

## 2024-09-10 PROCEDURE — 80053 COMPREHEN METABOLIC PANEL: CPT

## 2024-09-18 ENCOUNTER — LAB (OUTPATIENT)
Dept: LAB | Facility: LAB | Age: 34
End: 2024-09-18
Payer: COMMERCIAL

## 2024-09-18 LAB
AMPHETAMINES UR QL SCN: ABNORMAL
BARBITURATES UR QL SCN: ABNORMAL
BENZODIAZ UR QL SCN: ABNORMAL
BZE UR QL SCN: ABNORMAL
CANNABINOIDS UR QL SCN: ABNORMAL
FENTANYL+NORFENTANYL UR QL SCN: ABNORMAL
METHADONE UR QL SCN: ABNORMAL
OPIATES UR QL SCN: ABNORMAL
OXYCODONE+OXYMORPHONE UR QL SCN: ABNORMAL
PCP UR QL SCN: ABNORMAL

## 2024-09-18 PROCEDURE — 80307 DRUG TEST PRSMV CHEM ANLYZR: CPT

## 2024-09-23 LAB — CALPROTECTIN STL-MCNT: 11 UG/G

## 2024-10-23 NOTE — PROGRESS NOTES
REASON FOR VISIT:  Crohn's disease    HPI:  Benjamín Bear is a 34 y.o. male who presents for follow-up of complicated Crohn's disease.  Hx. of bipolar disorder, HTN, and ileocolonic Crohn s disease (dx 2014) c/b small bowel strictures s/p multiple SB resections with temporary DLI in 2017 s/p reversal in 2018. More recently developed significant perianal fistula/abscess 2022. Patient has failed multiple biologics including Remicade, Humira, Cimzia, Stelara, and Entyvio. Currently on Skyrizi.     2017 underwent small bowel resection x 2 w/ resection of 10cm strictured distal ileal segment w/ small bowel anastamosis and resection of 15 cm strictured TI + cecum w/ ileocolonic anastomosis. (25 cm total small bowel resected) (+) diverting loop ileostomy     1/2018: ileostomy closure (ileoscopy intra-op showed healthy appearing ileocolonic anastomosis).     Starting in summer 2021 had recurrent perianal pain with purulent drainage and was treated with steroids and abx. Moved to [place] early 2022 and had persistent symptoms of perianal fistulae/abscess.      2/2022 colonoscopy showed ulcerated stenosis in olimpia-TI 5 cm in with disease close to anastomosis and normal beyond 10 cm from anastomosis. Also with patchy colonic disease. SES-CD 20. (+) perianal fistula and seton placed in right postero-lateral fistula     Patient started on 6MP after procedure with plans to taper off prednisone. Did not improve on 6MP and never had therapeutic levels. Started on Skyrizi 8/2022.     6/2023 some improvement on the Skyrizi. 5-7 bowel movement (down from 15-20), blood with half of bowel movements. Stool was watery. He had gained weight (40 pounds since September). (+) nocturnal stools. Plan was to try Lomotil and fiber.     10/2023 colonoscopy showed a stricture (traversable after dilation) 5 cm into the olimpia-terminal ileum; dilated to 15 mm end size. Dilation caused disruption of ring and improved passage of the scope; mild Crohn's  ileitis and left colitis with SES-CD aggregate score of 8, significantly improved from prior study (previously SES-CD score 20 in 2/2022).    Had EUA 1/2024.  3 setons in place with multiple subcutaneous tracts all of which were in communication with each other, significant granulation tissue but no evidence of acute inflammation. 2 of the 3 setons removed.    Last seen virtually in 9/2024 and overall was doing well, having 5 liquid stools daily.  However, he no longer with any nocturnal stools.  Blood intermittently and it is always bright red; last for couple days and then resolve on its own.  No abdominal pain.  Appetite good and weight is stable.  He stated that he has always had liquid stools with his flares of Crohn's disease, but prior to his surgical resection, when the disease was more quiet the stools would form up some.  However ever since his surgery he has had persistent liquid stools even when he feels well.  Decided to try cholestyramine.    In follow-up today, doing well.  Cholestyramine in the evenings seems to help and using loperamide or Lomotil in AM before work.  Stools now solid in AM and much more comfortable.  Stool frequency 3-4 in 24 hours.  Usually just uses 2 anti-diarrheal tabs daily.      Tolerating Skyrizi fine.  Still some BRBPR.  Not daily, but comes and goes.  One seton in place.  Energy is good.  Appetite OK.  Still some abdominal pain on and off.  Usually after he eats.  Still careful with what he eats.  Avoids raw vegetables.    No IBD EIMs.      REVIEW OF SYSTEMS  Complete review of systems otherwise negative per complaint    Allergies   Allergen Reactions    Midazolam Other and Unknown    Penicillins Rash and Unknown       Past Medical History:   Diagnosis Date    Crohn's disease (Multi)     Other conditions influencing health status 02/11/2022    No significant past medical history    Personal history of other diseases of the digestive system     History of Crohn's disease     PONV (postoperative nausea and vomiting)        Past Surgical History:   Procedure Laterality Date    OTHER SURGICAL HISTORY  02/11/2022    Ileostomy    OTHER SURGICAL HISTORY  02/11/2022    Colectomy    OTHER SURGICAL HISTORY  02/11/2022    Ileostomy closure       Current Outpatient Medications   Medication Sig Dispense Refill    cholestyramine (Questran) 4 gram packet Take 1 packet (4 g) by mouth 2 times a day. 60 packet 11    loperamide (Imodium) 2 mg capsule Take 1 to 2 capsules every 6 hours as needed for chronic diarrhea 240 capsule 11    Skyrizi 360 mg/2.4 mL (150 mg/mL) wearable injector injection INSERT 1 CARTRIDGE INTO ON-BODY INJECTOR AND INJECT 360 MG UNDER THE SKIN EVERY 8 WEEKS. 2.4 mL 5     No current facility-administered medications for this visit.       PHYSICAL EXAM:  /84   Pulse 75   Temp 36.3 °C (97.3 °F)   Wt 88 kg (194 lb)   BMI 27.84 kg/m²   Vital signs reviewed  Patient alert and oriented in no acute distress  Anicteric  No cervical adenopathy  Cardiac exam regular rate and rhythm S1-S2 without murmurs gallops or rubs  Lungs clear to auscultation bilaterally  Abdomen soft and nontender without organomegaly or mass.  No rebound or guarding.  Bowel sounds present  Rectal seton in place in right posterior lateral region.  There is no perianal inflammation.  There is no perianal lesion.  Extremities without edema    LABS:  9/2024 [facility]     ASSESSMENT  # Crohn's disease- ileal, colonic, and perianal Crohn's disease.   Now on Skyrizi since summer 2022 and has had steady improvement in symptoms and improved disease control. Next colonoscopy would be in the fall 2026 as long as patient continues to do well.  However, he does have some ongoing rectal bleeding.  If this persist, we may need to do an earlier examination.    # Liquid diarrheal stools- ?component of bile acid diarrhea?  Trial of cholestyramine has improved bowel habits.  He will continue to work to optimize his bowel habits.   Stools are now formed.    # Bone health-he has not had a bone density in a while.  Last vitamin D was just below the lower limit of normal.  Will check DEXA scan and vitamin D level    PLAN  1) continue with Skyrizi maintenance injections every 8 weeks  2) check vitamin D level  3) check DEXA scan  4) follow-up with me in the summer 2025  5) as long as symptoms stable, next colonoscopy will be in the summer fall 2026

## 2024-10-24 ENCOUNTER — OFFICE VISIT (OUTPATIENT)
Dept: GASTROENTEROLOGY | Facility: CLINIC | Age: 34
End: 2024-10-24
Payer: COMMERCIAL

## 2024-10-24 VITALS
DIASTOLIC BLOOD PRESSURE: 84 MMHG | HEART RATE: 75 BPM | SYSTOLIC BLOOD PRESSURE: 124 MMHG | TEMPERATURE: 97.3 F | BODY MASS INDEX: 27.84 KG/M2 | WEIGHT: 194 LBS

## 2024-10-24 DIAGNOSIS — K50.813 CROHN'S DISEASE OF BOTH SMALL AND LARGE INTESTINE WITH FISTULA (MULTI): Primary | ICD-10-CM

## 2024-10-24 DIAGNOSIS — R19.7 DIARRHEA DUE TO MALABSORPTION (HHS-HCC): ICD-10-CM

## 2024-10-24 DIAGNOSIS — K90.9 DIARRHEA DUE TO MALABSORPTION (HHS-HCC): ICD-10-CM

## 2024-10-24 PROCEDURE — 99213 OFFICE O/P EST LOW 20 MIN: CPT | Performed by: INTERNAL MEDICINE

## 2024-10-24 PROCEDURE — 3074F SYST BP LT 130 MM HG: CPT | Performed by: INTERNAL MEDICINE

## 2024-10-24 PROCEDURE — 3079F DIAST BP 80-89 MM HG: CPT | Performed by: INTERNAL MEDICINE

## 2024-10-24 RX ORDER — DIPHENOXYLATE HYDROCHLORIDE AND ATROPINE SULFATE 2.5; .025 MG/5ML; MG/5ML
5 SOLUTION ORAL 4 TIMES DAILY PRN
COMMUNITY

## 2024-10-24 NOTE — PATIENT INSTRUCTIONS
Thanks for coming in for follow-up today.  It is great that you are feeling well and that your stool fecal calprotectin was normal.  As we discussed today:    PLAN  1) continue with Skyrizi maintenance injections every 8 weeks  2) check vitamin D level  3) check DEXA scan  4) follow-up with me in the summer 2025  5) as long as symptoms stable, next colonoscopy will be in the summer fall 2026

## 2024-11-06 ENCOUNTER — OFFICE VISIT (OUTPATIENT)
Dept: PRIMARY CARE | Facility: CLINIC | Age: 34
End: 2024-11-06
Payer: COMMERCIAL

## 2024-11-06 VITALS
OXYGEN SATURATION: 97 % | HEIGHT: 70 IN | RESPIRATION RATE: 18 BRPM | WEIGHT: 194 LBS | SYSTOLIC BLOOD PRESSURE: 118 MMHG | BODY MASS INDEX: 27.77 KG/M2 | DIASTOLIC BLOOD PRESSURE: 76 MMHG | TEMPERATURE: 96.8 F | HEART RATE: 77 BPM

## 2024-11-06 DIAGNOSIS — G43.009 MIGRAINE WITHOUT AURA AND WITHOUT STATUS MIGRAINOSUS, NOT INTRACTABLE: Primary | ICD-10-CM

## 2024-11-06 PROCEDURE — 3074F SYST BP LT 130 MM HG: CPT | Performed by: NURSE PRACTITIONER

## 2024-11-06 PROCEDURE — 99204 OFFICE O/P NEW MOD 45 MIN: CPT | Performed by: NURSE PRACTITIONER

## 2024-11-06 PROCEDURE — 3008F BODY MASS INDEX DOCD: CPT | Performed by: NURSE PRACTITIONER

## 2024-11-06 PROCEDURE — 1036F TOBACCO NON-USER: CPT | Performed by: NURSE PRACTITIONER

## 2024-11-06 PROCEDURE — 3078F DIAST BP <80 MM HG: CPT | Performed by: NURSE PRACTITIONER

## 2024-11-06 RX ORDER — TOPIRAMATE 25 MG/1
25 TABLET ORAL 2 TIMES DAILY
Qty: 60 TABLET | Refills: 5 | Status: SHIPPED | OUTPATIENT
Start: 2024-11-06 | End: 2025-05-05

## 2024-11-06 RX ORDER — RIZATRIPTAN BENZOATE 10 MG/1
10 TABLET ORAL ONCE AS NEEDED
Qty: 9 TABLET | Refills: 0 | Status: SHIPPED | OUTPATIENT
Start: 2024-11-06 | End: 2024-12-06

## 2024-11-06 ASSESSMENT — PATIENT HEALTH QUESTIONNAIRE - PHQ9
SUM OF ALL RESPONSES TO PHQ9 QUESTIONS 1 AND 2: 0
2. FEELING DOWN, DEPRESSED OR HOPELESS: NOT AT ALL
1. LITTLE INTEREST OR PLEASURE IN DOING THINGS: NOT AT ALL

## 2024-11-06 ASSESSMENT — ENCOUNTER SYMPTOMS
VOMITING: 0
HEMATOLOGIC/LYMPHATIC NEGATIVE: 1
FEVER: 0
MUSCULOSKELETAL NEGATIVE: 1
ALLERGIC/IMMUNOLOGIC NEGATIVE: 1
DIZZINESS: 0
WHEEZING: 0
SHORTNESS OF BREATH: 0
NUMBNESS: 0
COUGH: 0
LIGHT-HEADEDNESS: 0
DIARRHEA: 0
PSYCHIATRIC NEGATIVE: 1
CONSTIPATION: 0
WEAKNESS: 0
NAUSEA: 0
PHOTOPHOBIA: 0
DIAPHORESIS: 0
PALPITATIONS: 0
FATIGUE: 0

## 2024-11-06 ASSESSMENT — PAIN SCALES - GENERAL: PAINLEVEL_OUTOF10: 0-NO PAIN

## 2024-11-06 NOTE — PROGRESS NOTES
"Chief Complaint  Benjamín Bear is a 34 y.o. male presenting for \"Migraine (Has had migraines his whole life but they have become more frequent, takes otc headache meds).\"    Migraine   Pertinent negatives include no coughing, dizziness, fever, nausea, numbness, photophobia, vomiting or weakness.        Benjamín Bear is a 34 y.o. male presenting for migraines that he has always had but are getting worse as he gets older.        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 Do not exceed 30 mg in 24 hours.    Skyrizi 360 mg/2.4 mL (150 mg/mL) wearable injector injection INSERT 1 CARTRIDGE INTO ON-BODY INJECTOR AND INJECT 360 MG UNDER THE SKIN EVERY 8 WEEKS.    topiramate (TOPAMAX) 25 mg, oral, 2 times daily        Surgical History  He has a past surgical history that includes Other surgical history (02/11/2022); Other surgical history (02/11/2022); Other surgical history (02/11/2022); and Small intestine surgery (07/2017).     Social History  He reports that he has quit smoking. His smoking use included cigarettes. He has a 10 pack-year smoking history. He has been exposed to tobacco smoke. He has never used smokeless tobacco. He reports current alcohol use of about 10.0 standard drinks of " alcohol per week. He reports current drug use. Drug: Marijuana.    Family History  No family history on file.     Allergies  Midazolam, Penicillamine, and Penicillins    ROS  Review of Systems   Constitutional:  Negative for diaphoresis, fatigue and fever.   HENT: Negative.     Eyes:  Negative for photophobia and visual disturbance.   Respiratory:  Negative for cough, shortness of breath and wheezing.    Cardiovascular:  Negative for chest pain and palpitations.   Gastrointestinal:  Negative for constipation, diarrhea, nausea and vomiting.   Genitourinary: Negative.    Musculoskeletal: Negative.    Skin: Negative.    Allergic/Immunologic: Negative.    Neurological:  Negative for dizziness, weakness, light-headedness and numbness.   Hematological: Negative.    Psychiatric/Behavioral: Negative.          Last Recorded Vitals  /76 (BP Location: Right arm, Patient Position: Sitting, BP Cuff Size: Adult)   Pulse 77   Temp 36 °C (96.8 °F)   Resp 18   Wt 88 kg (194 lb)   SpO2 97%     Physical Exam    Relevant Results      Assessment/Plan   Benjamín was seen today for migraine.  Diagnoses and all orders for this visit:  Migraine without aura and without status migrainosus, not intractable (Primary)  -     topiramate (Topamax) 25 mg tablet; Take 1 tablet (25 mg) by mouth 2 times a day.  -     rizatriptan (Maxalt) 10 mg tablet; Take 1 tablet (10 mg) by mouth 1 time if needed for migraine. May repeat in 2 hours if unresolved. Do not exceed 30 mg in 24 hours.          COUNSELING      Medication education:              Education:  The patient is counseled regarding potential side-effects of any and all new medications             Understanding:  Patient expressed understanding             Adherence:  No barriers to adherence identified        Shelby Dowd, APRN-CNP

## 2024-11-08 ENCOUNTER — APPOINTMENT (OUTPATIENT)
Dept: RADIOLOGY | Facility: HOSPITAL | Age: 34
End: 2024-11-08
Payer: COMMERCIAL

## 2024-12-06 ENCOUNTER — APPOINTMENT (OUTPATIENT)
Dept: PRIMARY CARE | Facility: CLINIC | Age: 34
End: 2024-12-06
Payer: COMMERCIAL

## 2025-01-05 DIAGNOSIS — G43.009 MIGRAINE WITHOUT AURA AND WITHOUT STATUS MIGRAINOSUS, NOT INTRACTABLE: ICD-10-CM

## 2025-01-06 RX ORDER — TOPIRAMATE 25 MG/1
25 TABLET ORAL 2 TIMES DAILY
Qty: 180 TABLET | Refills: 2 | OUTPATIENT
Start: 2025-01-06

## 2025-01-06 NOTE — TELEPHONE ENCOUNTER
Prescription request received and populated   Pharmacy populated  Last Office Visit: 11/06/24  Has upcoming appmt 1/24/25

## 2025-01-10 ENCOUNTER — APPOINTMENT (OUTPATIENT)
Dept: PRIMARY CARE | Facility: CLINIC | Age: 35
End: 2025-01-10
Payer: COMMERCIAL

## 2025-01-24 ENCOUNTER — LAB (OUTPATIENT)
Dept: LAB | Facility: LAB | Age: 35
End: 2025-01-24
Payer: COMMERCIAL

## 2025-01-24 ENCOUNTER — OFFICE VISIT (OUTPATIENT)
Dept: PRIMARY CARE | Facility: CLINIC | Age: 35
End: 2025-01-24
Payer: COMMERCIAL

## 2025-01-24 VITALS
OXYGEN SATURATION: 98 % | SYSTOLIC BLOOD PRESSURE: 124 MMHG | DIASTOLIC BLOOD PRESSURE: 78 MMHG | RESPIRATION RATE: 18 BRPM | HEIGHT: 70 IN | BODY MASS INDEX: 27.2 KG/M2 | WEIGHT: 190 LBS | HEART RATE: 83 BPM | TEMPERATURE: 98 F

## 2025-01-24 DIAGNOSIS — Z86.39 HISTORY OF THYROID DISORDER: ICD-10-CM

## 2025-01-24 DIAGNOSIS — G43.009 MIGRAINE WITHOUT AURA AND WITHOUT STATUS MIGRAINOSUS, NOT INTRACTABLE: Primary | ICD-10-CM

## 2025-01-24 DIAGNOSIS — K50.813 CROHN'S DISEASE OF BOTH SMALL AND LARGE INTESTINE WITH FISTULA (MULTI): ICD-10-CM

## 2025-01-24 LAB
25(OH)D3 SERPL-MCNC: 22 NG/ML (ref 30–100)
TSH SERPL-ACNC: 1.56 MIU/L (ref 0.44–3.98)

## 2025-01-24 PROCEDURE — 99214 OFFICE O/P EST MOD 30 MIN: CPT | Performed by: NURSE PRACTITIONER

## 2025-01-24 PROCEDURE — 82306 VITAMIN D 25 HYDROXY: CPT

## 2025-01-24 PROCEDURE — 3008F BODY MASS INDEX DOCD: CPT | Performed by: NURSE PRACTITIONER

## 2025-01-24 PROCEDURE — 3078F DIAST BP <80 MM HG: CPT | Performed by: NURSE PRACTITIONER

## 2025-01-24 PROCEDURE — 1036F TOBACCO NON-USER: CPT | Performed by: NURSE PRACTITIONER

## 2025-01-24 PROCEDURE — 84443 ASSAY THYROID STIM HORMONE: CPT

## 2025-01-24 PROCEDURE — 3074F SYST BP LT 130 MM HG: CPT | Performed by: NURSE PRACTITIONER

## 2025-01-24 RX ORDER — TOPIRAMATE 25 MG/1
25 TABLET ORAL 2 TIMES DAILY
Qty: 180 TABLET | Refills: 1 | Status: SHIPPED | OUTPATIENT
Start: 2025-01-24 | End: 2025-07-23

## 2025-01-24 ASSESSMENT — ENCOUNTER SYMPTOMS
ALLERGIC/IMMUNOLOGIC NEGATIVE: 1
PSYCHIATRIC NEGATIVE: 1
VOMITING: 0
MUSCULOSKELETAL NEGATIVE: 1
HEMATOLOGIC/LYMPHATIC NEGATIVE: 1
FATIGUE: 0
WEAKNESS: 0
CONSTIPATION: 0
WHEEZING: 0
DIZZINESS: 0
SHORTNESS OF BREATH: 0
NAUSEA: 0
NUMBNESS: 0
FEVER: 0
PALPITATIONS: 0
DIARRHEA: 0
DIAPHORESIS: 0
COUGH: 0
LIGHT-HEADEDNESS: 0

## 2025-01-24 ASSESSMENT — PAIN SCALES - GENERAL: PAINLEVEL_OUTOF10: 0-NO PAIN

## 2025-01-24 NOTE — PROGRESS NOTES
"Chief Complaint  Benjamín eBar is a 34 y.o. male presenting for \"Follow-up (Pt is here for migraine follow up- states since starting medication has been getting migraines less. Pt states that ins is requesting 90 day supply in order to cover medication).\"    HPI     Benjamín Bear is a 34 y.o. male presenting for migraine follow up doing better on the topamax, had a little tingling in the beginning but it went away.  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 Do not exceed 30 mg in 24 hours.    Skyrizi 360 mg/2.4 mL (150 mg/mL) wearable injector injection INSERT 1 CARTRIDGE INTO ON-BODY INJECTOR AND INJECT 360 MG UNDER THE SKIN EVERY 8 WEEKS.    topiramate (TOPAMAX) 25 mg, oral, 2 times daily        Surgical History  He has a past surgical history that includes Other surgical history (02/11/2022); Other surgical history (02/11/2022); Other " surgical history (02/11/2022); and Small intestine surgery (07/2017).     Social History  He reports that he has quit smoking. His smoking use included cigarettes. He has a 10 pack-year smoking history. He has been exposed to tobacco smoke. He has never used smokeless tobacco. He reports current alcohol use of about 10.0 standard drinks of alcohol per week. He reports current drug use. Drug: Marijuana.    Family History  No family history on file.     Allergies  Midazolam, Penicillamine, and Penicillins    ROS  Review of Systems   Constitutional:  Negative for diaphoresis, fatigue and fever.   HENT: Negative.     Respiratory:  Negative for cough, shortness of breath and wheezing.    Cardiovascular:  Negative for chest pain and palpitations.   Gastrointestinal:  Negative for constipation, diarrhea, nausea and vomiting.   Genitourinary: Negative.    Musculoskeletal: Negative.    Skin: Negative.    Allergic/Immunologic: Negative.    Neurological:  Negative for dizziness, weakness, light-headedness and numbness.   Hematological: Negative.    Psychiatric/Behavioral: Negative.          Last Recorded Vitals  /78 (BP Location: Right arm, Patient Position: Sitting, BP Cuff Size: Small adult)   Pulse 83   Temp 36.7 °C (98 °F)   Resp 18   Wt 86.2 kg (190 lb)   SpO2 98%     Physical Exam  Vitals and nursing note reviewed.   Constitutional:       Appearance: Normal appearance.   Neck:      Thyroid: No thyromegaly.   Cardiovascular:      Rate and Rhythm: Normal rate and regular rhythm.      Pulses: Normal pulses.      Heart sounds: Normal heart sounds, S1 normal and S2 normal.   Pulmonary:      Effort: Pulmonary effort is normal.      Breath sounds: Normal breath sounds.   Musculoskeletal:         General: Normal range of motion.      Cervical back: Normal range of motion.   Lymphadenopathy:      Cervical: No cervical adenopathy.   Skin:     General: Skin is warm.         Relevant Results      Assessment/Plan   Benjamín  was seen today for follow-up.  Diagnoses and all orders for this visit:  Migraine without aura and without status migrainosus, not intractable (Primary)  -     topiramate (Topamax) 25 mg tablet; Take 1 tablet (25 mg) by mouth 2 times a day.  History of thyroid disorder  -     TSH with reflex to Free T4 if abnormal; Future          COUNSELING      Medication education:              Education:  The patient is counseled regarding potential side-effects of any and all new medications             Understanding:  Patient expressed understanding             Adherence:  No barriers to adherence identified        Shelby Dowd, APRN-CNP

## 2025-07-08 DIAGNOSIS — K50.813 CROHN'S DISEASE OF BOTH SMALL AND LARGE INTESTINE WITH FISTULA (MULTI): ICD-10-CM

## 2025-07-08 RX ORDER — RISANKIZUMAB-RZAA 360 MG/2.4
WEARABLE INJECTOR SUBCUTANEOUS
Qty: 2.4 ML | Refills: 5 | Status: SHIPPED | OUTPATIENT
Start: 2025-07-08

## 2025-07-14 NOTE — PROGRESS NOTES
REASON FOR VISIT:  Crohn's disease    HPI:  Benjamín Bear is a 35 y.o. male who presents for follow-up of complicated Crohn's disease.  Hx. of bipolar disorder, HTN, and ileocolonic Crohn s disease (dx 2014) c/b small bowel strictures s/p multiple SB resections with temporary DLI in 2017 s/p reversal in 2018. More recently developed significant perianal fistula/abscess 2022. Patient failed multiple biologics including Remicade, Humira, Cimzia, Stelara, and Entyvio. Currently on Skyrizi with good response.     2017 underwent small bowel resection x 2 w/ resection of 10 cm strictured distal ileal segment w/ small bowel anastamosis and resection of 15 cm strictured TI + cecum w/ ileocolonic anastomosis. (25 cm total small bowel resected) (+) diverting loop ileostomy     1/2018: ileostomy closure (ileoscopy intra-op showed healthy appearing ileocolonic anastomosis).     Summer 2021 had recurrent perianal pain with purulent drainage and was treated with steroids and abx. Moved to [place] early 2022 and had persistent symptoms of perianal fistulae/abscess.      2/2022 colonoscopy showed ulcerated stenosis in olimpia-TI 5 cm in with disease close to anastomosis and normal beyond 10 cm from anastomosis. Also with patchy colonic disease. SES-CD 20. (+) perianal fistula and seton placed in right postero-lateral fistula     Patient started on 6MP after procedure with plans to taper off prednisone. Did not improve on 6MP and never had therapeutic levels. Started  Skyrizi 8/2022.     6/2023 some improvement on the Skyrizi. 5-7 bowel movement (down from 15-20), blood with half of bowel movements. Stool was watery. He had gained weight (40 pounds since September). (+) nocturnal stools. Plan was to try Lomotil and fiber.      10/2023 colonoscopy showed a stricture (traversable after dilation) 5 cm into the olimpia-terminal ileum, dilated to 15 mm; mild Crohn's ileitis and left colitis with SES-CD=8, significantly improved from prior  study (previously SES-CD score 20 in 2/2022).     1/2024 EUA 3 setons in place with multiple subcutaneous tracts all of which were in communication with each other, significant granulation tissue but no evidence of acute inflammation. 2 of the 3 setons removed.     Seen virtually 9/2024 and overall was doing well, having 5 liquid stools daily.  No more nocturnal stools. FCP 11. He reported that ever since his surgery he has had persistent liquid stools even though felt well.  Decided to try cholestyramine.     Daniele seen in the office 10/2024 and was doing well.  Cholestyramine in the evenings seemed to help and using loperamide or Lomotil in AM before work.  Stools were now solid in AM and much more comfortable.  Stool frequency 3-4 in 24 hours.  Usually just uses 2 anti-diarrheal tabs daily.  Tolerating Skyrizi fine.  Still some BRBPR.  Not daily, but comes and goes.  One seton in place.    In follow-up today, pt reports that he continues to feel well. Continues to have about 3-4 bowel movements daily. Says these have been a little more loose lately as he is not taking his cholestyramine as frequently. Also reports some epigastric pain with meals when he doesn't take this. Otherwise denies any nocturnal symptoms, urgency, blood in the stool, nausea/vomiting, rectal pain/drainage. Still has one seton drain which is not bothersome. Continues to use loperamide daily.     Reloc[place] to [place] very soon and inquiring about prior auth renewal and GI referrals.       REVIEW OF SYSTEMS  GI ROS otherwise negative.     Allergies[1]    Medical History[2]    Surgical History[3]    Current Medications[4]    PHYSICAL EXAM:  BP (!) 139/98   Pulse 83   Temp 36 °C (96.8 °F)   Wt 83.6 kg (184 lb 4.8 oz)   SpO2 96%   BMI 26.44 kg/m²   GEN: Well-appearing, NAD, well-nourished  HEENT: NCAT,  MMM, no scleral icterus   Resp: Normal work of breathing on RA, lungs CTAB   CV: RRR, S1 and S2. No m/r/g. Peripheral pulses 2+    Abd: Soft, nontender, nondistended. NABS  Ext: No edema or other gross deformities    Lab Results   Component Value Date    WBC 4.5 09/10/2024    HGB 16.4 09/10/2024    HCT 47.6 09/10/2024    MCV 87 09/10/2024     09/10/2024     Lab Results   Component Value Date    ALT 24 09/10/2024    AST 23 09/10/2024    ALKPHOS 64 09/10/2024    BILITOT 1.3 (H) 09/10/2024       === 06/30/22 ===    CT ABDOMEN PELVIS W IV CONTRAST    - Impression -  1. Wall thickening with adjacent inflammatory changes at the distal  small bowel at the anastomosis at the right lower quadrant,  suggestive of enteritis/active Crohn's disease.  2. Seton posterior to the anus, at the site of the previously  described perianal fistula, with mild adjacent soft tissue stranding.  Please correlate with clinical exam to exclude superimposed  infection/inflammation.  3. Distended gallbladder.      ASSESSMENT  #Ileocolonic Crohn's-Complicated dz with h/o ileal strictures and perianal abscesses; currently one seton drain in place.  Failed multiple medications: Remicade, Humira, Cimzia, Stelara, and Entyvio.  However, currently in clinical remission on Skyrizi.      Will continue Skyrizi. Loperamide, psyllium, and cholestyramine (currently taking as needed) for loose stools.      Repeat colonoscopy summer/fall 2026.    Moving to [facility], but still working for [employer].  Refer to Dr. Gonzalo Oleary.    PLAN  1) Labs today - FCP, CBC, CMP, ESR, CRP  2) Continue Skyrizi q8 weeks  3) Continue loperamide daily and cholestyramine PRN   4) Will refer to GI group in [place] for continuation of care                    [1]   Allergies  Allergen Reactions    Midazolam Other and Unknown    Penicillamine Other    Penicillins Rash and Unknown   [2]   Past Medical History:  Diagnosis Date    Anxiety     Crohn's disease (Multi)     Depression     Other conditions influencing health status 02/11/2022    No significant past medical history    Personal history of  other diseases of the digestive system     History of Crohn's disease    PONV (postoperative nausea and vomiting)    [3]   Past Surgical History:  Procedure Laterality Date    OTHER SURGICAL HISTORY  02/11/2022    Ileostomy    OTHER SURGICAL HISTORY  02/11/2022    Colectomy    OTHER SURGICAL HISTORY  02/11/2022    Ileostomy closure    SMALL INTESTINE SURGERY  07/2017   [4]   Current Outpatient Medications   Medication Sig Dispense Refill    cholestyramine (Questran) 4 gram packet Take 1 packet (4 g) by mouth 2 times a day. 60 packet 11    diphenoxylate-atropine (Lomotil) 2.5-0.025 mg/5 mL liquid Take 5 mL by mouth 4 times a day as needed for diarrhea.      loperamide (Imodium) 2 mg capsule Take 1 to 2 capsules every 6 hours as needed for chronic diarrhea 240 capsule 11    rizatriptan (Maxalt) 10 mg tablet Take 1 tablet (10 mg) by mouth 1 time if needed for migraine. May repeat in 2 hours if unresolved. Do not exceed 30 mg in 24 hours. 9 tablet 0    Skyrizi 360 mg/2.4 mL (150 mg/mL) wearable injector injection INSERT 1 CARTRIDGE INTO ON-BODY INJECTOR AND INJECT 360 MG UNDER THE SKIN EVERY 8 WEEKS 2.4 mL 5    topiramate (Topamax) 25 mg tablet Take 1 tablet (25 mg) by mouth 2 times a day. 180 tablet 1     No current facility-administered medications for this visit.

## 2025-07-15 ENCOUNTER — APPOINTMENT (OUTPATIENT)
Dept: GASTROENTEROLOGY | Facility: HOSPITAL | Age: 35
End: 2025-07-15
Payer: COMMERCIAL

## 2025-07-15 VITALS
BODY MASS INDEX: 26.44 KG/M2 | OXYGEN SATURATION: 96 % | SYSTOLIC BLOOD PRESSURE: 139 MMHG | DIASTOLIC BLOOD PRESSURE: 98 MMHG | HEART RATE: 83 BPM | WEIGHT: 184.3 LBS | TEMPERATURE: 96.8 F

## 2025-07-15 DIAGNOSIS — K50.813 CROHN'S DISEASE OF BOTH SMALL AND LARGE INTESTINE WITH FISTULA (MULTI): Primary | ICD-10-CM

## 2025-07-15 PROCEDURE — 3075F SYST BP GE 130 - 139MM HG: CPT | Performed by: INTERNAL MEDICINE

## 2025-07-15 PROCEDURE — 99214 OFFICE O/P EST MOD 30 MIN: CPT | Performed by: INTERNAL MEDICINE

## 2025-07-15 PROCEDURE — 1036F TOBACCO NON-USER: CPT | Performed by: INTERNAL MEDICINE

## 2025-07-15 PROCEDURE — 99212 OFFICE O/P EST SF 10 MIN: CPT | Performed by: INTERNAL MEDICINE

## 2025-07-15 PROCEDURE — 3080F DIAST BP >= 90 MM HG: CPT | Performed by: INTERNAL MEDICINE

## 2025-07-15 RX ORDER — BUPROPION HYDROCHLORIDE 75 MG/1
TABLET ORAL
COMMUNITY
Start: 2025-07-04

## 2025-07-15 RX ORDER — VENLAFAXINE HYDROCHLORIDE 150 MG/1
TABLET, EXTENDED RELEASE ORAL
COMMUNITY
Start: 2025-05-22

## 2025-07-15 SDOH — ECONOMIC STABILITY: FOOD INSECURITY: WITHIN THE PAST 12 MONTHS, THE FOOD YOU BOUGHT JUST DIDN'T LAST AND YOU DIDN'T HAVE MONEY TO GET MORE.: NEVER TRUE

## 2025-07-15 SDOH — ECONOMIC STABILITY: FOOD INSECURITY: WITHIN THE PAST 12 MONTHS, YOU WORRIED THAT YOUR FOOD WOULD RUN OUT BEFORE YOU GOT MONEY TO BUY MORE.: NEVER TRUE

## 2025-07-15 ASSESSMENT — LIFESTYLE VARIABLES
HOW OFTEN DO YOU HAVE SIX OR MORE DRINKS ON ONE OCCASION: NEVER
SKIP TO QUESTIONS 9-10: 1
AUDIT-C TOTAL SCORE: 2
HOW MANY STANDARD DRINKS CONTAINING ALCOHOL DO YOU HAVE ON A TYPICAL DAY: 1 OR 2
HOW OFTEN DO YOU HAVE A DRINK CONTAINING ALCOHOL: 2-4 TIMES A MONTH

## 2025-07-15 ASSESSMENT — PATIENT HEALTH QUESTIONNAIRE - PHQ9
1. LITTLE INTEREST OR PLEASURE IN DOING THINGS: NOT AT ALL
2. FEELING DOWN, DEPRESSED OR HOPELESS: NOT AT ALL
SUM OF ALL RESPONSES TO PHQ9 QUESTIONS 1 & 2: 0

## 2025-07-15 ASSESSMENT — PAIN SCALES - GENERAL: PAINLEVEL_OUTOF10: 0-NO PAIN

## 2025-07-15 NOTE — PATIENT INSTRUCTIONS
Thank you for coming in for follow-up today.  It is great that you continue to feel well on Skyrizi therapy.  We will continue this without change.  You are due for some lab monitoring and we will also check a stool fecal calprotectin to make sure that your Crohn's disease remains under control.  As we reviewed today:    1) continue Skyrizi maintenance injections every 8 weeks  2) check stool calprotectin  3) check labs  4) since you are relocating to the [facility], you will need to seek out a new physician.  You may want to look into seeing Dr. Gonzalo Oleary of [facility]  Dr. Oleary is one of our former trainees.  The office number is [phone].

## 2025-07-24 LAB
ALBUMIN SERPL-MCNC: 4.7 G/DL (ref 3.6–5.1)
ALP SERPL-CCNC: 55 U/L (ref 36–130)
ALT SERPL-CCNC: 40 U/L (ref 9–46)
ANION GAP SERPL CALCULATED.4IONS-SCNC: 11 MMOL/L (CALC) (ref 7–17)
AST SERPL-CCNC: 42 U/L (ref 10–40)
BASOPHILS # BLD AUTO: 29 CELLS/UL (ref 0–200)
BASOPHILS NFR BLD AUTO: 0.6 %
BILIRUB SERPL-MCNC: 1.1 MG/DL (ref 0.2–1.2)
BUN SERPL-MCNC: 19 MG/DL (ref 7–25)
CALCIUM SERPL-MCNC: 9.9 MG/DL (ref 8.6–10.3)
CHLORIDE SERPL-SCNC: 105 MMOL/L (ref 98–110)
CO2 SERPL-SCNC: 24 MMOL/L (ref 20–32)
CREAT SERPL-MCNC: 0.94 MG/DL (ref 0.6–1.26)
CRP SERPL-MCNC: <3 MG/L
EGFRCR SERPLBLD CKD-EPI 2021: 109 ML/MIN/1.73M2
EOSINOPHIL # BLD AUTO: 58 CELLS/UL (ref 15–500)
EOSINOPHIL NFR BLD AUTO: 1.2 %
ERYTHROCYTE [DISTWIDTH] IN BLOOD BY AUTOMATED COUNT: 12.6 % (ref 11–15)
GLUCOSE SERPL-MCNC: 81 MG/DL (ref 65–99)
HCT VFR BLD AUTO: 52.4 % (ref 38.5–50)
HGB BLD-MCNC: 17.7 G/DL (ref 13.2–17.1)
IGNF NEG CNTRL BLD: NORMAL
LYMPHOCYTES # BLD AUTO: 1368 CELLS/UL (ref 850–3900)
LYMPHOCYTES NFR BLD AUTO: 28.5 %
M TB IFN-G BLD-IMP: NEGATIVE
MCH RBC QN AUTO: 31.8 PG (ref 27–33)
MCHC RBC AUTO-ENTMCNC: 33.8 G/DL (ref 32–36)
MCV RBC AUTO: 94.2 FL (ref 80–100)
MITOGEN IGNF.SPOT COUNT BLD: NORMAL
MONOCYTES # BLD AUTO: 461 CELLS/UL (ref 200–950)
MONOCYTES NFR BLD AUTO: 9.6 %
NEUTROPHILS # BLD AUTO: 2885 CELLS/UL (ref 1500–7800)
NEUTROPHILS NFR BLD AUTO: 60.1 %
PLATELET # BLD AUTO: 317 THOUSAND/UL (ref 140–400)
PMV BLD REES-ECKER: 8.7 FL (ref 7.5–12.5)
POTASSIUM SERPL-SCNC: 4.3 MMOL/L (ref 3.5–5.3)
PROT SERPL-MCNC: 7 G/DL (ref 6.1–8.1)
QUEST PANEL A SPOT COUNT: 2
QUEST PANEL B SPOT COUNT: 0
RBC # BLD AUTO: 5.56 MILLION/UL (ref 4.2–5.8)
SODIUM SERPL-SCNC: 140 MMOL/L (ref 135–146)
WBC # BLD AUTO: 4.8 THOUSAND/UL (ref 3.8–10.8)

## 2025-07-27 LAB — CALPROTECTIN STL-MCNT: 44 MCG/G

## (undated) DEVICE — DRAPE, LEGGINGS, 48 X 31 IN, STERILE, LF

## (undated) DEVICE — COVER, CART, 45 X 27 X 48 IN, CLEAR

## (undated) DEVICE — DRESSING, ABDOMINAL PAD, CURITY, 8 X 10 IN

## (undated) DEVICE — TIP, SUCTION, YANKAUER, FLEXIBLE

## (undated) DEVICE — Device

## (undated) DEVICE — SUTURE, SOFSILK* 0  BLACK 12 X 18 PRE-CUT"

## (undated) DEVICE — DRESSING, NON-ADHERENT, TELFA, OUCHLESS, 3 X 8 IN, STERILE

## (undated) DEVICE — MANIFOLD, 4 PORT NEPTUNE STANDARD

## (undated) DEVICE — BRIEF, CURITY, XLARGE, MESH